# Patient Record
Sex: MALE | Race: WHITE | NOT HISPANIC OR LATINO | Employment: OTHER | URBAN - METROPOLITAN AREA
[De-identification: names, ages, dates, MRNs, and addresses within clinical notes are randomized per-mention and may not be internally consistent; named-entity substitution may affect disease eponyms.]

---

## 2023-11-23 DIAGNOSIS — K21.9 GASTROESOPHAGEAL REFLUX DISEASE, UNSPECIFIED WHETHER ESOPHAGITIS PRESENT: ICD-10-CM

## 2023-11-24 PROBLEM — R97.20 BPH WITH ELEVATED PSA: Status: ACTIVE | Noted: 2019-06-25

## 2023-11-24 PROBLEM — E11.9 DIABETES MELLITUS (MULTI): Status: ACTIVE | Noted: 2019-06-25

## 2023-11-24 PROBLEM — I25.10 ATHEROSCLEROTIC CARDIOVASCULAR DISEASE: Status: ACTIVE | Noted: 2023-11-24

## 2023-11-24 PROBLEM — M51.26 DISC DISPLACEMENT, LUMBAR: Status: ACTIVE | Noted: 2023-11-24

## 2023-11-24 PROBLEM — R91.1 LUNG NODULE: Status: ACTIVE | Noted: 2020-04-21

## 2023-11-24 PROBLEM — J06.9 VIRAL UPPER RESPIRATORY INFECTION: Status: ACTIVE | Noted: 2020-03-06

## 2023-11-24 PROBLEM — K21.00 GERD WITH ESOPHAGITIS: Status: ACTIVE | Noted: 2019-03-18

## 2023-11-24 PROBLEM — H91.10 HEARING LOSS OF AGING: Status: ACTIVE | Noted: 2021-06-28

## 2023-11-24 PROBLEM — I82.90 VENOUS THROMBOSIS: Status: ACTIVE | Noted: 2023-11-24

## 2023-11-24 PROBLEM — E83.52 HYPERCALCEMIA: Status: ACTIVE | Noted: 2019-12-04

## 2023-11-24 PROBLEM — I20.9 ANGINA PECTORIS (CMS-HCC): Status: ACTIVE | Noted: 2023-11-24

## 2023-11-24 PROBLEM — I25.10 CORONARY ARTERY DISEASE: Status: ACTIVE | Noted: 2023-01-05

## 2023-11-24 PROBLEM — E03.9 HYPOTHYROID: Status: ACTIVE | Noted: 2019-03-18

## 2023-11-24 PROBLEM — Z85.850 HX OF THYROID CANCER: Status: ACTIVE | Noted: 2019-06-25

## 2023-11-24 PROBLEM — M62.552 ATROPHY OF MUSCLE OF LEFT THIGH: Status: ACTIVE | Noted: 2019-01-23

## 2023-11-24 PROBLEM — B35.6 TINEA CRURIS: Status: ACTIVE | Noted: 2019-06-06

## 2023-11-24 PROBLEM — E87.5 HYPERKALEMIA: Status: ACTIVE | Noted: 2020-06-15

## 2023-11-24 PROBLEM — N40.0 BPH WITH ELEVATED PSA: Status: ACTIVE | Noted: 2019-06-25

## 2023-11-24 PROBLEM — G57.22: Status: ACTIVE | Noted: 2021-01-25

## 2023-11-24 RX ORDER — OMEPRAZOLE 40 MG/1
40 CAPSULE, DELAYED RELEASE ORAL DAILY
Qty: 90 CAPSULE | Refills: 3 | Status: SHIPPED | OUTPATIENT
Start: 2023-11-24 | End: 2024-04-15 | Stop reason: SDUPTHER

## 2023-12-11 DIAGNOSIS — M51.26 DISC DISPLACEMENT, LUMBAR: Primary | ICD-10-CM

## 2023-12-12 RX ORDER — PREGABALIN 50 MG/1
CAPSULE ORAL
Qty: 90 CAPSULE | Refills: 1 | Status: SHIPPED | OUTPATIENT
Start: 2023-12-12 | End: 2024-03-31

## 2023-12-14 ENCOUNTER — TELEPHONE (OUTPATIENT)
Dept: PRIMARY CARE | Facility: CLINIC | Age: 73
End: 2023-12-14
Payer: COMMERCIAL

## 2023-12-14 DIAGNOSIS — E78.5 HYPERLIPIDEMIA, UNSPECIFIED HYPERLIPIDEMIA TYPE: ICD-10-CM

## 2023-12-14 RX ORDER — ATORVASTATIN CALCIUM 80 MG/1
80 TABLET, FILM COATED ORAL DAILY
Qty: 90 TABLET | Refills: 3 | Status: SHIPPED | OUTPATIENT
Start: 2023-12-14 | End: 2024-12-13

## 2023-12-14 RX ORDER — ATORVASTATIN CALCIUM 80 MG/1
80 TABLET, FILM COATED ORAL DAILY
COMMUNITY
End: 2023-12-14 | Stop reason: SDUPTHER

## 2023-12-14 NOTE — TELEPHONE ENCOUNTER
[facility]    ATORVASTATIN 80 MG  *NEEDS ENOUGH TO GET HIM TILL HIS APPT 1/9/24

## 2024-01-09 ENCOUNTER — APPOINTMENT (OUTPATIENT)
Dept: PRIMARY CARE | Facility: CLINIC | Age: 74
End: 2024-01-09
Payer: COMMERCIAL

## 2024-01-23 ENCOUNTER — OFFICE VISIT (OUTPATIENT)
Dept: PRIMARY CARE | Facility: CLINIC | Age: 74
End: 2024-01-23
Payer: COMMERCIAL

## 2024-01-23 VITALS
WEIGHT: 201 LBS | OXYGEN SATURATION: 96 % | DIASTOLIC BLOOD PRESSURE: 80 MMHG | HEIGHT: 69 IN | SYSTOLIC BLOOD PRESSURE: 128 MMHG | HEART RATE: 71 BPM | BODY MASS INDEX: 29.77 KG/M2

## 2024-01-23 DIAGNOSIS — I25.10 CORONARY ARTERY DISEASE INVOLVING NATIVE CORONARY ARTERY OF NATIVE HEART, UNSPECIFIED WHETHER ANGINA PRESENT: ICD-10-CM

## 2024-01-23 DIAGNOSIS — Z12.11 SCREENING FOR COLON CANCER: ICD-10-CM

## 2024-01-23 DIAGNOSIS — E83.52 HYPERCALCEMIA: ICD-10-CM

## 2024-01-23 DIAGNOSIS — Z00.00 WELL ADULT EXAM: Primary | ICD-10-CM

## 2024-01-23 DIAGNOSIS — E87.5 HYPERKALEMIA: ICD-10-CM

## 2024-01-23 DIAGNOSIS — E03.9 HYPOTHYROIDISM, UNSPECIFIED TYPE: ICD-10-CM

## 2024-01-23 DIAGNOSIS — E13.9 DIABETES MELLITUS OF OTHER TYPE WITHOUT COMPLICATION, UNSPECIFIED WHETHER LONG TERM INSULIN USE (MULTI): ICD-10-CM

## 2024-01-23 DIAGNOSIS — K21.00 GASTROESOPHAGEAL REFLUX DISEASE WITH ESOPHAGITIS WITHOUT HEMORRHAGE: ICD-10-CM

## 2024-01-23 PROCEDURE — 1125F AMNT PAIN NOTED PAIN PRSNT: CPT | Performed by: FAMILY MEDICINE

## 2024-01-23 PROCEDURE — 3079F DIAST BP 80-89 MM HG: CPT | Performed by: FAMILY MEDICINE

## 2024-01-23 PROCEDURE — 3074F SYST BP LT 130 MM HG: CPT | Performed by: FAMILY MEDICINE

## 2024-01-23 PROCEDURE — G0439 PPPS, SUBSEQ VISIT: HCPCS | Performed by: FAMILY MEDICINE

## 2024-01-23 PROCEDURE — 1159F MED LIST DOCD IN RCRD: CPT | Performed by: FAMILY MEDICINE

## 2024-01-23 PROCEDURE — 1036F TOBACCO NON-USER: CPT | Performed by: FAMILY MEDICINE

## 2024-01-23 RX ORDER — FLUTICASONE PROPIONATE 50 MCG
1 SPRAY, SUSPENSION (ML) NASAL
COMMUNITY
Start: 2019-11-13

## 2024-01-23 RX ORDER — METOPROLOL SUCCINATE 100 MG/1
1 TABLET, EXTENDED RELEASE ORAL DAILY
COMMUNITY
Start: 2023-12-01

## 2024-01-23 RX ORDER — NAPROXEN SODIUM 220 MG/1
TABLET, FILM COATED ORAL EVERY 24 HOURS
COMMUNITY

## 2024-01-23 RX ORDER — AMLODIPINE BESYLATE 5 MG/1
1 TABLET ORAL DAILY
COMMUNITY
Start: 2023-05-30

## 2024-01-23 RX ORDER — ACETAMINOPHEN 500 MG
TABLET ORAL
COMMUNITY

## 2024-01-23 RX ORDER — LEVOTHYROXINE SODIUM 150 UG/1
TABLET ORAL
COMMUNITY
Start: 2022-05-31 | End: 2024-04-12

## 2024-01-23 RX ORDER — EZETIMIBE 10 MG/1
10 TABLET ORAL DAILY
COMMUNITY

## 2024-01-23 RX ORDER — MULTIVITAMIN
1 TABLET ORAL
COMMUNITY

## 2024-01-23 RX ORDER — ALBUTEROL SULFATE 90 UG/1
2 AEROSOL, METERED RESPIRATORY (INHALATION) EVERY 4 HOURS PRN
COMMUNITY
Start: 2023-06-13

## 2024-01-23 RX ORDER — ICOSAPENT ETHYL 1000 MG/1
2 CAPSULE ORAL
COMMUNITY

## 2024-01-23 RX ORDER — ACETAMINOPHEN 325 MG/1
TABLET ORAL EVERY 6 HOURS PRN
COMMUNITY
Start: 2022-10-26

## 2024-01-23 ASSESSMENT — PATIENT HEALTH QUESTIONNAIRE - PHQ9
SUM OF ALL RESPONSES TO PHQ9 QUESTIONS 1 AND 2: 0
2. FEELING DOWN, DEPRESSED OR HOPELESS: NOT AT ALL
1. LITTLE INTEREST OR PLEASURE IN DOING THINGS: NOT AT ALL

## 2024-01-23 ASSESSMENT — PAIN SCALES - GENERAL: PAINLEVEL: 4

## 2024-01-23 NOTE — PROGRESS NOTES
Subjective   Patient ID: Justin Brandt is a 73 y.o. male who presents for Annual Exam.    HPI The patient presents for a comprehensive physical exam. PMH, PSH, family history and social history were reviewed and updated. Tetanus and shingles vaccination statuses are UTD. He will discuss AAA screening with his cardiologist. He had fasting labs completed recently. He had HIV and hepatitis C screening completed in 2014. The results were negative.   Patient has coronary disease.  He is status post CABG in 2022.  He [facility]ology [facility].  Pt has Hx Hyperkalemia: noted in the past and in the recent labs, asymptomatic, will recheck potassium level, admits to eating a lot of artichokes.  Pt has DM type 2: controlled, compliant with medication, follows a DM diet, walks dog 3-4 days a week, no other exercise routine due to chronic back pain, does not check glucose at home often, DM eye exam completed in March without DM changes, has never had a DM foot exam, urine microalbumin test is overdue.   Pt has HTN: controlled, compliant with medication, under the care of Dr. Khan (seen every 6 months), checks BP at home and average is 130s/70s, does not follow a low salt diet, exercises 3-4 days a week.   Pt has Hyperlipidemia: controlled, , compliant with medication, follows a low fat diet, exercises, needs refill.   Pt has Hypothyroidism: controlled, TSH normal, compliant with medication, needs refill.  Pt has Lipomas of the chest: has had them for a few years, one below left breast is growing and causing discomfort, one in the left axillary line is growing and causing pain, patient requests a surgical consultation.   Pt has GERD with esophagitis: EGD completed 5 years ago and will be repeated next month, needs refill for omeprazole.   Pt has For the past 10 days he has had loose bowels . He had recently spent time in [place] and was eating different foods . He has known history of diverticulosis and  feels he may be having a mild flare . He has noticed increase in stool frequency with mild bloating of his abdomen . No recent diet change since he is return from [place]. He has noticed no blood.      Review of Systems  GENERAL: denies lack of energy, unexplained weight gain or weight loss, loss of appetite, fever, night sweats.  HEENT: denies sinus problems, runny nose, post-nasal drip, ringing in ears, mouth sores, loose teeth, ear pain, nosebleeds, sore throat, facial pain or numbness, admits to difficulty with hearing.  CV: denies irregular heartbeat, racing heart, chest pains, swelling of feet or legs, pain in legs with walking.  RESPIRATORY: denies shortness of breath, prolonged cough, wheezing, sputum production, prior tuberculosis, pleurisy, oxygen at home, coughing up blood, abnormal chest x-ray.  GI: Patient is positive for change in stool quantity and quality. denies heartburn, constipation, intolerance to certain foods, diarrhea, abdominal pain, difficulty swallowing, nausea, vomiting, blood in stools, unexplained change in bowel habits, incontinence.  : denies painful urination, frequent urination, urgency, bladder problems, prostate problems, impotence.  MS: denies joint pain, aching muscles, swelling of joints, joint deformities, admits to back pain.  INTEGUMENT: denies persistent rash, itching, new skin lesion, hair loss or increase, admits to change in existing skin lesions.  NEUROLOGIC: denies frequent headaches, double vision, weakness, change in sensation, problems with walking or balance, dizziness, tremor, loss of consciousness, uncontrolled motions, episodes of visual loss.  PSYCHIATRIC: denies insomnia, irritability, depression, anxiety, recurrent bad thoughts.  ENDOCRINOLOGIC: denies intolerance to heat or cold, frequent hunger/urination/thirst.  HEMATOLOGIC: denies easy bleeding, easy bruising, anemia, leukemia, unexplained swollen areas.  ALLERGIC/IMMUNOLOGIC: denies seasonal  "allergies, hay fever symptoms, itching, frequent infections, exposure to HIV.  Objective   Ht 1.74 m (5' 8.5\")   Wt 91.2 kg (201 lb)   BMI 30.11 kg/m²     Physical Exam  General appearance: Well developed, overweight, in no acute distress.  Skin: Inspection of the skin reveals no rashes, ulceration, or petechiae. There are multiple lipomas on the trunk in the chest area. Two are tender with palpation: below the left breast and right axillary line.   HEENT: The sclerae were anicteric and conjunctivae were pink and moist. Extraocular movements were intact and pupils were equal, round, and reactive to light with normal accommodation. External inspection of the ears and nose showed no scars, lesions, or masses. EACs clear, TMs translucent, ossicles normal appearance, hearing intact. Nasal mucosa non-inflamed, turbinates normal. Lips, teeth, and gums showed normal mucosa. The oral mucosa, hard and soft palate, tongue and posterior pharynx were normal.  Neck: Supple and symmetric. There was no thyroid enlargement, and no tenderness, or masses were felt.   Lungs: Auscultation of the lungs revealed normal breath sounds without any other adventitious sounds.  Cardiovascular: There was a regular rate and rhythm without any murmurs, gallops, or rubs. There were no carotid bruits. Peripheral pulses were 2+ and symmetric. Brisk capillary refill.  Abdomen: Soft and nontender with normal bowel sounds. The liver was not enlarged or tender. The spleen was not palpable. No ascites was noted.  Lymph nodes: No lymphadenopathy was appreciated in the neck.  Musculoskeletal: There was no tenderness or effusions noted. Muscle strength and tone were normal.   Extremities: No cyanosis, clubbing, or edema.  Neurologic: Alert and oriented x 3. Normal affect. Normal deep tendon reflexes with no pathological reflexes. Sensation to touch was normal.   Rectal: Deferred.   Breasts: Deferred.  Genital: Deferred.  DM foot exam: Skin temperature " normal. Skin intact. Pulses normal. Monofilament test normal.   Assessment/Plan   Problem List Items Addressed This Visit             ICD-10-CM    Coronary artery disease  Chronic.  Will get a lipid panel in the near future.Patient status post CABG.  Patient sees [facility]ology at the [facility]. I25.10    Relevant Medications    metoprolol succinate XL (Toprol-XL) 100 mg 24 hr tablet    amLODIPine (Norvasc) 5 mg tablet    Diabetes mellitus (CMS/HCC)  Needs workup.  Patient have lab work done in the near future including a hemoglobin A1c.   E11.9    Relevant Orders    CBC and Auto Differential    Comprehensive Metabolic Panel    Lipid Panel    Urinalysis with Reflex Microscopic    TSH with reflex to Free T4 if abnormal    Hemoglobin A1C    GERD with esophagitis  Stable.  Continue on omeprazole. K21.00    Hypercalcemia  Presumed stable.  Will get lab work done in the near future. E83.52    Relevant Orders    CBC and Auto Differential    Comprehensive Metabolic Panel    Lipid Panel    Urinalysis with Reflex Microscopic    TSH with reflex to Free T4 if abnormal    Hemoglobin A1C    Hyperkalemia  Presumed stable.  Will get lab work done in the near future. E87.5    Relevant Orders    CBC and Auto Differential    Comprehensive Metabolic Panel    Lipid Panel    Urinalysis with Reflex Microscopic    TSH with reflex to Free T4 if abnormal    Hemoglobin A1C    Hypothyroid  Needs workup.  Will get a TSH in the near future.  At this time continue on Synthroid d.a.w. 150 mcg daily. E03.9    Relevant Orders    CBC and Auto Differential    Comprehensive Metabolic Panel    Lipid Panel    Urinalysis with Reflex Microscopic    TSH with reflex to Free T4 if abnormal    Hemoglobin A1C     Other Visit Diagnoses         Codes    Well adult exam    -  Primary  Normal exam Z00.00    Screening for colon cancer    Needs referral.  Patient is due for screening colonoscopy. Z12.11    Relevant Orders    Referral to Gastroenterology

## 2024-01-31 ENCOUNTER — LAB (OUTPATIENT)
Dept: LAB | Facility: LAB | Age: 74
End: 2024-01-31
Payer: COMMERCIAL

## 2024-01-31 DIAGNOSIS — E87.5 HYPERKALEMIA: ICD-10-CM

## 2024-01-31 DIAGNOSIS — E13.9 DIABETES MELLITUS OF OTHER TYPE WITHOUT COMPLICATION, UNSPECIFIED WHETHER LONG TERM INSULIN USE (MULTI): ICD-10-CM

## 2024-01-31 DIAGNOSIS — E83.52 HYPERCALCEMIA: ICD-10-CM

## 2024-01-31 DIAGNOSIS — E03.9 HYPOTHYROIDISM, UNSPECIFIED TYPE: ICD-10-CM

## 2024-01-31 LAB
ALBUMIN SERPL-MCNC: 4.6 G/DL (ref 3.5–5)
ALP BLD-CCNC: 144 U/L (ref 35–125)
ALT SERPL-CCNC: 30 U/L (ref 5–40)
ANION GAP SERPL CALC-SCNC: 10 MMOL/L
APPEARANCE UR: CLEAR
AST SERPL-CCNC: 19 U/L (ref 5–40)
BASOPHILS # BLD AUTO: 0.05 X10*3/UL (ref 0–0.1)
BASOPHILS NFR BLD AUTO: 0.9 %
BILIRUB SERPL-MCNC: 0.5 MG/DL (ref 0.1–1.2)
BILIRUB UR STRIP.AUTO-MCNC: NEGATIVE MG/DL
BUN SERPL-MCNC: 18 MG/DL (ref 8–25)
CALCIUM SERPL-MCNC: 10 MG/DL (ref 8.5–10.4)
CHLORIDE SERPL-SCNC: 101 MMOL/L (ref 97–107)
CHOLEST SERPL-MCNC: 125 MG/DL (ref 133–200)
CHOLEST/HDLC SERPL: 3.5 {RATIO}
CO2 SERPL-SCNC: 28 MMOL/L (ref 24–31)
COLOR UR: NORMAL
CREAT SERPL-MCNC: 1.1 MG/DL (ref 0.4–1.6)
EGFRCR SERPLBLD CKD-EPI 2021: 70 ML/MIN/1.73M*2
EOSINOPHIL # BLD AUTO: 0.12 X10*3/UL (ref 0–0.4)
EOSINOPHIL NFR BLD AUTO: 2.2 %
ERYTHROCYTE [DISTWIDTH] IN BLOOD BY AUTOMATED COUNT: 14.1 % (ref 11.5–14.5)
EST. AVERAGE GLUCOSE BLD GHB EST-MCNC: 174 MG/DL
GLUCOSE SERPL-MCNC: 189 MG/DL (ref 65–99)
GLUCOSE UR STRIP.AUTO-MCNC: NORMAL MG/DL
HBA1C MFR BLD: 7.7 %
HCT VFR BLD AUTO: 41 % (ref 41–52)
HDLC SERPL-MCNC: 36 MG/DL
HGB BLD-MCNC: 13.3 G/DL (ref 13.5–17.5)
IMM GRANULOCYTES # BLD AUTO: 0.02 X10*3/UL (ref 0–0.5)
IMM GRANULOCYTES NFR BLD AUTO: 0.4 % (ref 0–0.9)
KETONES UR STRIP.AUTO-MCNC: NEGATIVE MG/DL
LDLC SERPL CALC-MCNC: 48 MG/DL (ref 65–130)
LEUKOCYTE ESTERASE UR QL STRIP.AUTO: NEGATIVE
LYMPHOCYTES # BLD AUTO: 1.63 X10*3/UL (ref 0.8–3)
LYMPHOCYTES NFR BLD AUTO: 29.9 %
MCH RBC QN AUTO: 28.1 PG (ref 26–34)
MCHC RBC AUTO-ENTMCNC: 32.4 G/DL (ref 32–36)
MCV RBC AUTO: 87 FL (ref 80–100)
MONOCYTES # BLD AUTO: 0.49 X10*3/UL (ref 0.05–0.8)
MONOCYTES NFR BLD AUTO: 9 %
NEUTROPHILS # BLD AUTO: 3.14 X10*3/UL (ref 1.6–5.5)
NEUTROPHILS NFR BLD AUTO: 57.6 %
NITRITE UR QL STRIP.AUTO: NEGATIVE
NRBC BLD-RTO: 0 /100 WBCS (ref 0–0)
PH UR STRIP.AUTO: 5.5 [PH]
PLATELET # BLD AUTO: 178 X10*3/UL (ref 150–450)
POTASSIUM SERPL-SCNC: 5 MMOL/L (ref 3.4–5.1)
PROT SERPL-MCNC: 6.4 G/DL (ref 5.9–7.9)
PROT UR STRIP.AUTO-MCNC: NEGATIVE MG/DL
RBC # BLD AUTO: 4.73 X10*6/UL (ref 4.5–5.9)
RBC # UR STRIP.AUTO: NEGATIVE /UL
SODIUM SERPL-SCNC: 139 MMOL/L (ref 133–145)
SP GR UR STRIP.AUTO: 1.02
TRIGL SERPL-MCNC: 207 MG/DL (ref 40–150)
TSH SERPL DL<=0.05 MIU/L-ACNC: 0.74 MIU/L (ref 0.27–4.2)
UROBILINOGEN UR STRIP.AUTO-MCNC: NORMAL MG/DL
WBC # BLD AUTO: 5.5 X10*3/UL (ref 4.4–11.3)

## 2024-01-31 PROCEDURE — 85025 COMPLETE CBC W/AUTO DIFF WBC: CPT

## 2024-01-31 PROCEDURE — 36415 COLL VENOUS BLD VENIPUNCTURE: CPT

## 2024-01-31 PROCEDURE — 80061 LIPID PANEL: CPT

## 2024-01-31 PROCEDURE — 80053 COMPREHEN METABOLIC PANEL: CPT

## 2024-01-31 PROCEDURE — 84443 ASSAY THYROID STIM HORMONE: CPT

## 2024-01-31 PROCEDURE — 83036 HEMOGLOBIN GLYCOSYLATED A1C: CPT

## 2024-01-31 PROCEDURE — 81003 URINALYSIS AUTO W/O SCOPE: CPT

## 2024-02-01 ENCOUNTER — TELEPHONE (OUTPATIENT)
Dept: PRIMARY CARE | Facility: CLINIC | Age: 74
End: 2024-02-01
Payer: COMMERCIAL

## 2024-02-07 ENCOUNTER — OFFICE VISIT (OUTPATIENT)
Dept: PRIMARY CARE | Facility: CLINIC | Age: 74
End: 2024-02-07
Payer: COMMERCIAL

## 2024-02-07 VITALS
WEIGHT: 198 LBS | OXYGEN SATURATION: 98 % | BODY MASS INDEX: 29.66 KG/M2 | RESPIRATION RATE: 18 BRPM | SYSTOLIC BLOOD PRESSURE: 124 MMHG | HEART RATE: 60 BPM | DIASTOLIC BLOOD PRESSURE: 64 MMHG

## 2024-02-07 DIAGNOSIS — E13.9 DIABETES MELLITUS OF OTHER TYPE WITHOUT COMPLICATION, UNSPECIFIED WHETHER LONG TERM INSULIN USE (MULTI): Primary | ICD-10-CM

## 2024-02-07 PROCEDURE — 1170F FXNL STATUS ASSESSED: CPT | Performed by: FAMILY MEDICINE

## 2024-02-07 PROCEDURE — 3074F SYST BP LT 130 MM HG: CPT | Performed by: FAMILY MEDICINE

## 2024-02-07 PROCEDURE — 1159F MED LIST DOCD IN RCRD: CPT | Performed by: FAMILY MEDICINE

## 2024-02-07 PROCEDURE — 3078F DIAST BP <80 MM HG: CPT | Performed by: FAMILY MEDICINE

## 2024-02-07 PROCEDURE — 1036F TOBACCO NON-USER: CPT | Performed by: FAMILY MEDICINE

## 2024-02-07 PROCEDURE — 1125F AMNT PAIN NOTED PAIN PRSNT: CPT | Performed by: FAMILY MEDICINE

## 2024-02-07 PROCEDURE — 99214 OFFICE O/P EST MOD 30 MIN: CPT | Performed by: FAMILY MEDICINE

## 2024-02-07 PROCEDURE — 3051F HG A1C>EQUAL 7.0%<8.0%: CPT | Performed by: FAMILY MEDICINE

## 2024-02-07 PROCEDURE — 3048F LDL-C <100 MG/DL: CPT | Performed by: FAMILY MEDICINE

## 2024-02-07 RX ORDER — GLIMEPIRIDE 1 MG/1
1 TABLET ORAL
Qty: 90 TABLET | Refills: 3 | Status: SHIPPED | OUTPATIENT
Start: 2024-02-07 | End: 2025-02-06

## 2024-02-07 ASSESSMENT — ACTIVITIES OF DAILY LIVING (ADL)
BATHING: INDEPENDENT
DRESSING YOURSELF: INDEPENDENT
GROOMING: INDEPENDENT
HEARING - RIGHT EAR: HEARING AID
WALKS IN HOME: INDEPENDENT
HEARING - LEFT EAR: HEARING AID
ADEQUATE_TO_COMPLETE_ADL: YES
FEEDING YOURSELF: INDEPENDENT
JUDGMENT_ADEQUATE_SAFELY_COMPLETE_DAILY_ACTIVITIES: YES
PATIENT'S MEMORY ADEQUATE TO SAFELY COMPLETE DAILY ACTIVITIES?: YES
TOILETING: INDEPENDENT

## 2024-02-07 ASSESSMENT — ENCOUNTER SYMPTOMS
LOSS OF SENSATION IN FEET: 0
OCCASIONAL FEELINGS OF UNSTEADINESS: 0
DEPRESSION: 0

## 2024-02-07 ASSESSMENT — PAIN SCALES - GENERAL: PAINLEVEL: 7

## 2024-02-07 NOTE — PROGRESS NOTES
Subjective   Patient ID: Justin Brandt is a 73 y.o. male who presents for Results (Blood work results).    [facility] For diabetic check.  Patient had recent labs done at his physical and his hemoglobin A1c was elevated.  His hemoglobin A1c is 7.7.  In the past he was on metformin but did not tolerate the medication which caused him diarrhea.  He has not been on any other medications.    Review of Systems  Constitutional: Patient is negative for fever, fatigue, weight change.  HEENT: Patient is negative for change in vision, hearing, swallow.  Cardio: Patient is negative for chest pain, lower extremity edema.  Pulmonary: Patient is negative for cough, shortness of breath.  Objective   /64 (BP Location: Left arm, Patient Position: Sitting, BP Cuff Size: Large adult)   Pulse 60   Resp 18   Wt 89.8 kg (198 lb)   SpO2 98%   BMI 29.66 kg/m²     Physical Exam  General: Awake and alert in no apparent distress.  HEENT: Moist oral mucosa no cervical lymphadenopathy.  Cardio: Heart S1-S2 no murmur rub or gallop.  Pulmonary: Lungs clear to auscultation bilaterally.  Assessment/Plan   Problem List Items Addressed This Visit             ICD-10-CM    Diabetes mellitus (CMS/[facility]) - Primary  Needs better control.  Begin glimepiride 1 mg daily.  Patient will follow-up in 3 months for recheck. E11.9    Relevant Medications    glimepiride (AmaryL) 1 mg tablet    Other Relevant Orders    Follow Up In Primary Care - Established

## 2024-03-11 ENCOUNTER — PATIENT OUTREACH (OUTPATIENT)
Dept: PRIMARY CARE | Facility: CLINIC | Age: 74
End: 2024-03-11
Payer: COMMERCIAL

## 2024-03-11 NOTE — PROGRESS NOTES
Patient has declined PCP follow up and TCM services at this time. He states that he will see pulmonology in April and then will follow up with PCP. He was very thankful for this call.

## 2024-04-12 DIAGNOSIS — E03.9 HYPOTHYROIDISM, UNSPECIFIED TYPE: ICD-10-CM

## 2024-04-12 RX ORDER — LEVOTHYROXINE SODIUM 150 UG/1
TABLET ORAL
Qty: 90 TABLET | Refills: 2 | Status: SHIPPED | OUTPATIENT
Start: 2024-04-12

## 2024-04-15 ENCOUNTER — TELEPHONE (OUTPATIENT)
Dept: PRIMARY CARE | Facility: CLINIC | Age: 74
End: 2024-04-15
Payer: COMMERCIAL

## 2024-04-15 DIAGNOSIS — K21.9 GASTROESOPHAGEAL REFLUX DISEASE, UNSPECIFIED WHETHER ESOPHAGITIS PRESENT: ICD-10-CM

## 2024-04-15 RX ORDER — OMEPRAZOLE 20 MG/1
20 CAPSULE, DELAYED RELEASE ORAL DAILY
Qty: 90 CAPSULE | Refills: 0 | Status: SHIPPED | OUTPATIENT
Start: 2024-04-15

## 2024-04-15 NOTE — TELEPHONE ENCOUNTER
Patient is calling in for a refill on Omeprazole ER 20 mg #90, please send to [facility] on [address].  Seen last on 2/7/24.

## 2024-04-15 NOTE — TELEPHONE ENCOUNTER
Patient called Rx line and requested a refill for Omeprazole 20 mg. The dose was previously 40 mg, and the pharmacy needs this updated in order to refill it.  Pharmacy: [facility] [address]  Patient phone #: [phone]

## 2024-05-19 DIAGNOSIS — M51.26 DISC DISPLACEMENT, LUMBAR: ICD-10-CM

## 2024-05-20 RX ORDER — PREGABALIN 50 MG/1
CAPSULE ORAL
Qty: 90 CAPSULE | Refills: 0 | Status: SHIPPED | OUTPATIENT
Start: 2024-05-20

## 2024-06-30 DIAGNOSIS — M51.26 DISC DISPLACEMENT, LUMBAR: ICD-10-CM

## 2024-07-01 RX ORDER — PREGABALIN 50 MG/1
CAPSULE ORAL
Qty: 90 CAPSULE | Refills: 0 | Status: SHIPPED | OUTPATIENT
Start: 2024-07-01

## 2024-08-11 DIAGNOSIS — K21.9 GASTROESOPHAGEAL REFLUX DISEASE, UNSPECIFIED WHETHER ESOPHAGITIS PRESENT: ICD-10-CM

## 2024-08-11 DIAGNOSIS — M51.26 DISC DISPLACEMENT, LUMBAR: ICD-10-CM

## 2024-08-12 ENCOUNTER — APPOINTMENT (OUTPATIENT)
Dept: PRIMARY CARE | Facility: CLINIC | Age: 74
End: 2024-08-12
Payer: COMMERCIAL

## 2024-08-12 RX ORDER — PREGABALIN 50 MG/1
CAPSULE ORAL
Qty: 90 CAPSULE | Refills: 0 | Status: SHIPPED | OUTPATIENT
Start: 2024-08-12

## 2024-08-12 RX ORDER — OMEPRAZOLE 20 MG/1
20 CAPSULE, DELAYED RELEASE ORAL DAILY
Qty: 90 CAPSULE | Refills: 3 | Status: SHIPPED | OUTPATIENT
Start: 2024-08-12

## 2024-09-15 DIAGNOSIS — M51.26 DISC DISPLACEMENT, LUMBAR: ICD-10-CM

## 2024-09-16 RX ORDER — PREGABALIN 50 MG/1
CAPSULE ORAL
Qty: 90 CAPSULE | Refills: 1 | Status: SHIPPED | OUTPATIENT
Start: 2024-09-16

## 2024-09-26 ENCOUNTER — APPOINTMENT (OUTPATIENT)
Dept: PRIMARY CARE | Facility: CLINIC | Age: 74
End: 2024-09-26
Payer: COMMERCIAL

## 2024-09-26 VITALS
WEIGHT: 198 LBS | HEART RATE: 53 BPM | HEIGHT: 69 IN | BODY MASS INDEX: 29.33 KG/M2 | DIASTOLIC BLOOD PRESSURE: 78 MMHG | OXYGEN SATURATION: 95 % | SYSTOLIC BLOOD PRESSURE: 164 MMHG

## 2024-09-26 DIAGNOSIS — M51.26 DISC DISPLACEMENT, LUMBAR: Primary | ICD-10-CM

## 2024-09-26 DIAGNOSIS — R16.1 SPLENOMEGALY: ICD-10-CM

## 2024-09-26 PROCEDURE — 1036F TOBACCO NON-USER: CPT | Performed by: FAMILY MEDICINE

## 2024-09-26 PROCEDURE — 1159F MED LIST DOCD IN RCRD: CPT | Performed by: FAMILY MEDICINE

## 2024-09-26 PROCEDURE — 1126F AMNT PAIN NOTED NONE PRSNT: CPT | Performed by: FAMILY MEDICINE

## 2024-09-26 PROCEDURE — 99214 OFFICE O/P EST MOD 30 MIN: CPT | Performed by: FAMILY MEDICINE

## 2024-09-26 ASSESSMENT — PATIENT HEALTH QUESTIONNAIRE - PHQ9
SUM OF ALL RESPONSES TO PHQ9 QUESTIONS 1 AND 2: 0
1. LITTLE INTEREST OR PLEASURE IN DOING THINGS: NOT AT ALL
2. FEELING DOWN, DEPRESSED OR HOPELESS: NOT AT ALL

## 2024-09-26 ASSESSMENT — PAIN SCALES - GENERAL: PAINLEVEL: 0-NO PAIN

## 2024-09-26 NOTE — PROGRESS NOTES
"Subjective   Patient ID: Justin Brandt is a 74 y.o. male who presents for Follow-up (Test results - was referred back here from his hematologist regarding enlarged spleen//Flu vaccine declined today).    HPI here for multiple issues.    Patient patient has specialist at the [facility].  He has antiphospholipid syndrome.  This syndrome can mimic autoimmune disease.  It also makes patient a set up for coagulation.  Of concern, is an enlarged spleen that was found on CT scan.  However investigation finds that patient had a an ultrasound of the spleen done in April, 2024 that was normal.  The CT scan was done in July 2024 that showed enlargement.  Patient has chronic back pain.  He has had multiple surgeries.  He has been seen by pain management in the past.  He does not desire surgeries at this time.  He would like to return to his pain management physician.      Review of Systems  Constitutional: Patient is negative for fever, fatigue, weight change.  HEENT: Patient is negative for change in vision, hearing, swallow.  Cardio: Patient is negative for chest pain, lower extremity edema.  Pulmonary: Patient is negative for cough, shortness of breath  Gastrointestinal: Patient is positive for left upper quadrant fullness without pain.  Musculoskeletal: Patient is positive for low back pain.  Objective   /78 (BP Location: Left arm, Patient Position: Sitting, BP Cuff Size: Adult)   Pulse 53   Ht 1.74 m (5' 8.5\")   Wt 89.8 kg (198 lb)   SpO2 95%   BMI 29.67 kg/m²     Physical Exam  General: Awake and alert no apparent distress.  HEENT: Moist oral mucosa no cervical lymphadenopathy.  Cardio: Heart S1-S2 no murmur rub or gallop.  Pulmonary: Lungs clear to auscultation bilaterally.  Abdomen: Soft nontender without organomegaly.  Assessment/Plan   Problem List Items Addressed This Visit             ICD-10-CM    Disc displacement, lumbar - Primary   needs referral.  Patient referred back to pain management. M51.26    " Relevant Orders    Referral to Pain Medicine     Other Visit Diagnoses         Codes    Splenomegaly    needs workup.  Since patient has had a normal and then elevated spleen size recently, will get another ultrasound of the spleen to document its current size. R16.1    Relevant Orders    US abdomen limited spleen

## 2024-09-30 ENCOUNTER — HOSPITAL ENCOUNTER (OUTPATIENT)
Dept: RADIOLOGY | Facility: HOSPITAL | Age: 74
Discharge: HOME | End: 2024-09-30
Payer: COMMERCIAL

## 2024-09-30 DIAGNOSIS — R16.1 SPLENOMEGALY: ICD-10-CM

## 2024-09-30 PROCEDURE — 76705 ECHO EXAM OF ABDOMEN: CPT

## 2024-09-30 PROCEDURE — 76705 ECHO EXAM OF ABDOMEN: CPT | Performed by: RADIOLOGY

## 2024-10-31 ENCOUNTER — OFFICE VISIT (OUTPATIENT)
Dept: PAIN MEDICINE | Facility: CLINIC | Age: 74
End: 2024-10-31
Payer: COMMERCIAL

## 2024-10-31 VITALS
RESPIRATION RATE: 16 BRPM | WEIGHT: 198 LBS | HEIGHT: 69 IN | OXYGEN SATURATION: 95 % | DIASTOLIC BLOOD PRESSURE: 78 MMHG | BODY MASS INDEX: 29.33 KG/M2 | SYSTOLIC BLOOD PRESSURE: 140 MMHG | HEART RATE: 57 BPM

## 2024-10-31 DIAGNOSIS — M96.1 POSTLAMINECTOMY SYNDROME, LUMBAR REGION: Primary | ICD-10-CM

## 2024-10-31 DIAGNOSIS — M48.062 NEUROGENIC CLAUDICATION DUE TO LUMBAR SPINAL STENOSIS: ICD-10-CM

## 2024-10-31 DIAGNOSIS — G89.29 OTHER CHRONIC PAIN: ICD-10-CM

## 2024-10-31 DIAGNOSIS — M54.16 LUMBAR RADICULOPATHY: ICD-10-CM

## 2024-10-31 PROCEDURE — 99214 OFFICE O/P EST MOD 30 MIN: CPT | Performed by: ANESTHESIOLOGY

## 2024-10-31 PROCEDURE — 1159F MED LIST DOCD IN RCRD: CPT | Performed by: ANESTHESIOLOGY

## 2024-10-31 PROCEDURE — 1036F TOBACCO NON-USER: CPT | Performed by: ANESTHESIOLOGY

## 2024-10-31 PROCEDURE — 1160F RVW MEDS BY RX/DR IN RCRD: CPT | Performed by: ANESTHESIOLOGY

## 2024-10-31 ASSESSMENT — ENCOUNTER SYMPTOMS
ARTHRALGIAS: 1
CONSTITUTIONAL NEGATIVE: 1
ALLERGIC/IMMUNOLOGIC NEGATIVE: 1
GASTROINTESTINAL NEGATIVE: 1
BACK PAIN: 1
HEMATOLOGIC/LYMPHATIC NEGATIVE: 1
EYES NEGATIVE: 1
PSYCHIATRIC NEGATIVE: 1
ENDOCRINE NEGATIVE: 1
WEAKNESS: 1
RESPIRATORY NEGATIVE: 1
CARDIOVASCULAR NEGATIVE: 1

## 2024-10-31 ASSESSMENT — PATIENT HEALTH QUESTIONNAIRE - PHQ9
1. LITTLE INTEREST OR PLEASURE IN DOING THINGS: NOT AT ALL
SUM OF ALL RESPONSES TO PHQ9 QUESTIONS 1 AND 2: 0
2. FEELING DOWN, DEPRESSED OR HOPELESS: NOT AT ALL

## 2024-10-31 ASSESSMENT — PAIN SCALES - GENERAL
PAINLEVEL_OUTOF10: 10-WORST PAIN EVER
PAINLEVEL_OUTOF10: 10 - WORST POSSIBLE PAIN

## 2024-10-31 ASSESSMENT — PAIN DESCRIPTION - DESCRIPTORS: DESCRIPTORS: ACHING

## 2024-10-31 ASSESSMENT — PAIN - FUNCTIONAL ASSESSMENT: PAIN_FUNCTIONAL_ASSESSMENT: 0-10

## 2024-10-31 ASSESSMENT — LIFESTYLE VARIABLES: TOTAL SCORE: 0

## 2024-11-13 ENCOUNTER — ANCILLARY PROCEDURE (OUTPATIENT)
Dept: RADIOLOGY | Facility: EXTERNAL LOCATION | Age: 74
End: 2024-11-13
Payer: COMMERCIAL

## 2024-11-13 DIAGNOSIS — M96.1 POSTLAMINECTOMY SYNDROME, NOT ELSEWHERE CLASSIFIED: ICD-10-CM

## 2024-11-13 PROCEDURE — 62323 NJX INTERLAMINAR LMBR/SAC: CPT | Performed by: ANESTHESIOLOGY

## 2024-11-13 NOTE — PROGRESS NOTES
Preprocedure diagnosis: Lumbar postlaminectomy syndrome  Postprocedure diagnosis lumbar postlaminectomy syndrome    Procedure performed: L2-3 epidural steroid injection under fluoroscopic guidance    Physician: Donte Moreno MD    Anesthesia: The patient received light sedation/anxiolysis for the procedure today.  The patient had a normal response to verbal stimulation throughout the entire procedure.  Airway, ventilation, and cardiovascular systems were unaffected.        Complications: none    Blood loss:  none    Clinical note: This is a very pleasant 74-year-old male who suffers with low back and leg pain here meeting all medical criteria for above-mentioned procedure.    Procedure:      The patient was identified in the preoperative area.  The procedure was discussed in detail including its risks, benefits and alternatives.  Signed consent was obtained and the patient agreed to proceed.    The patient was brought to the [facility] procedure room and was positioned in the prone position onto the procedure room table.  A pillow was placed below the patient's abdomen.  A safety strap was placed across the legs.  The lumbar region was then exposed, prepped and draped in the usual sterile fashion using 2% Chloroprep scrub.  After that, under fluoroscopic guidance in the AP view the L2/3 intralaminar space was identified.  The intended entry point was marked onto the skin and then 10 ml of 0.5% preservative-free lidocaine was injected using a 25 gauge needle to anesthetize the skin and subcutaneous tissues along the intended needle trajectory.  Next, an 18 Tuohy needle was advanced under intermittent fluoroscopic guidance until bony contact was made with the lamina of L3.  The Tuohy needle was then walked off the lamina in a cephalad manner into the L2/3 intralaminar space.  Using a loss of resistance technique, the epidural space was entered with ease.  Images were taken in the AP and contralateral  oblique views.  The stylette was removed from the needle and the needle was aspirated, which was negative for heme and CSF.   Then after another negative aspirate, 40 mg of triamcinolone mixed with 4 mL of preservative-free 0.5% lidocaine was injected via the Tuohy needle.  The needle was then removed and a bandage was applied.  The patient tolerated the procedure well and was transferred back to the discharge area.  The patient was monitored for 15 minutes and vital signs were stable.  The patient was discharged home in stable condition with a .

## 2024-12-08 DIAGNOSIS — M51.26 DISC DISPLACEMENT, LUMBAR: ICD-10-CM

## 2024-12-08 DIAGNOSIS — E78.5 HYPERLIPIDEMIA, UNSPECIFIED HYPERLIPIDEMIA TYPE: ICD-10-CM

## 2024-12-09 ENCOUNTER — TELEPHONE (OUTPATIENT)
Dept: PRIMARY CARE | Facility: CLINIC | Age: 74
End: 2024-12-09

## 2024-12-09 RX ORDER — ATORVASTATIN CALCIUM 80 MG/1
80 TABLET, FILM COATED ORAL DAILY
Qty: 90 TABLET | Refills: 3 | Status: SHIPPED | OUTPATIENT
Start: 2024-12-09

## 2024-12-09 RX ORDER — PREGABALIN 50 MG/1
CAPSULE ORAL
Qty: 90 CAPSULE | Refills: 1 | Status: SHIPPED | OUTPATIENT
Start: 2024-12-09

## 2024-12-11 NOTE — TELEPHONE ENCOUNTER
Patient would like to speak to [facility] for a few minutes, he stated it could be at the end of [facility]'s shift. He has questions regarding Keto supplement to lose weight.    Patient can be reached at  [phone]

## 2024-12-18 ENCOUNTER — APPOINTMENT (OUTPATIENT)
Dept: PRIMARY CARE | Facility: CLINIC | Age: 74
End: 2024-12-18
Payer: COMMERCIAL

## 2024-12-18 VITALS
HEIGHT: 69 IN | OXYGEN SATURATION: 96 % | HEART RATE: 62 BPM | BODY MASS INDEX: 29.41 KG/M2 | TEMPERATURE: 97.7 F | DIASTOLIC BLOOD PRESSURE: 60 MMHG | SYSTOLIC BLOOD PRESSURE: 148 MMHG | WEIGHT: 198.6 LBS

## 2024-12-18 DIAGNOSIS — E66.09 OBESITY DUE TO EXCESS CALORIES, UNSPECIFIED CLASS, UNSPECIFIED WHETHER SERIOUS COMORBIDITY PRESENT: Primary | ICD-10-CM

## 2024-12-18 PROCEDURE — 1159F MED LIST DOCD IN RCRD: CPT | Performed by: FAMILY MEDICINE

## 2024-12-18 PROCEDURE — 99213 OFFICE O/P EST LOW 20 MIN: CPT | Performed by: FAMILY MEDICINE

## 2024-12-18 PROCEDURE — 1036F TOBACCO NON-USER: CPT | Performed by: FAMILY MEDICINE

## 2024-12-18 PROCEDURE — 1125F AMNT PAIN NOTED PAIN PRSNT: CPT | Performed by: FAMILY MEDICINE

## 2024-12-18 ASSESSMENT — PAIN SCALES - GENERAL: PAINLEVEL_OUTOF10: 4

## 2024-12-18 NOTE — PROGRESS NOTES
"Subjective   Patient ID: Justin Brandt is a 74 y.o. male who presents for Consult (Discuss patient taking vitamin keto supplement along with his other medications.).    HPI patient has been having a difficult time losing weight.  He has an OTC keto supplement that he would like to try.    Review of Systems  Constitution: Patient is positive for gradual weight gain over the past 5 years.  He is negative for change in weight, fever.  HEENT: Patient stated for change in vision, hearing, swallow.  Cardio: Patient is negative for chest pain, lower extremity edema.  Pulmonary: Patient is negative for cough, shortness of breath.  Objective   /60 (BP Location: Left arm, Patient Position: Sitting, BP Cuff Size: Large adult)   Pulse 62   Temp 36.5 °C (97.7 °F) (Temporal)   Ht 1.74 m (5' 8.5\")   SpO2 96%   BMI 29.67 kg/m²     Physical Exam  General: Awake and alert no apparent distress.  HEENT: Moist oral mucosa no cervical adenopathy.  Cardiac: Heart S1-S2 no murmur rub or gallop.  Pulmonary: Lungs clear to auscultation bilaterally.  Assessment/Plan   Problem List Items Addressed This Visit    None  Visit Diagnoses         Codes    Obesity due to excess calories, unspecified class, unspecified whether serious comorbidity present    -  Primary patient will attempt to use the OTC keto supplement.  If he is unsuccessful he will follow-up here at which time we will consider GLP-1 if available E66.09               "

## 2025-01-23 ENCOUNTER — OFFICE VISIT (OUTPATIENT)
Dept: PAIN MEDICINE | Facility: CLINIC | Age: 75
End: 2025-01-23
Payer: COMMERCIAL

## 2025-01-23 VITALS
HEART RATE: 60 BPM | SYSTOLIC BLOOD PRESSURE: 130 MMHG | OXYGEN SATURATION: 99 % | RESPIRATION RATE: 22 BRPM | DIASTOLIC BLOOD PRESSURE: 60 MMHG | WEIGHT: 190 LBS | BODY MASS INDEX: 27.2 KG/M2 | HEIGHT: 70 IN

## 2025-01-23 DIAGNOSIS — M54.42 CHRONIC BILATERAL LOW BACK PAIN WITH BILATERAL SCIATICA: ICD-10-CM

## 2025-01-23 DIAGNOSIS — M54.41 CHRONIC BILATERAL LOW BACK PAIN WITH BILATERAL SCIATICA: ICD-10-CM

## 2025-01-23 DIAGNOSIS — M48.062 SPINAL STENOSIS OF LUMBAR REGION WITH NEUROGENIC CLAUDICATION: Primary | ICD-10-CM

## 2025-01-23 DIAGNOSIS — M54.16 LUMBAR RADICULOPATHY: ICD-10-CM

## 2025-01-23 DIAGNOSIS — G89.29 CHRONIC BILATERAL LOW BACK PAIN WITH BILATERAL SCIATICA: ICD-10-CM

## 2025-01-23 DIAGNOSIS — M51.26 DISC DISPLACEMENT, LUMBAR: ICD-10-CM

## 2025-01-23 DIAGNOSIS — M96.1 POSTLAMINECTOMY SYNDROME OF LUMBAR REGION: ICD-10-CM

## 2025-01-23 PROCEDURE — 1159F MED LIST DOCD IN RCRD: CPT | Performed by: NURSE PRACTITIONER

## 2025-01-23 PROCEDURE — G2211 COMPLEX E/M VISIT ADD ON: HCPCS | Performed by: NURSE PRACTITIONER

## 2025-01-23 PROCEDURE — 1160F RVW MEDS BY RX/DR IN RCRD: CPT | Performed by: NURSE PRACTITIONER

## 2025-01-23 PROCEDURE — 99214 OFFICE O/P EST MOD 30 MIN: CPT | Performed by: NURSE PRACTITIONER

## 2025-01-23 PROCEDURE — 1036F TOBACCO NON-USER: CPT | Performed by: NURSE PRACTITIONER

## 2025-01-23 PROCEDURE — 1125F AMNT PAIN NOTED PAIN PRSNT: CPT | Performed by: NURSE PRACTITIONER

## 2025-01-23 RX ORDER — PREGABALIN 50 MG/1
50 CAPSULE ORAL 3 TIMES DAILY
Qty: 90 CAPSULE | Refills: 1 | Status: SHIPPED | OUTPATIENT
Start: 2025-01-23

## 2025-01-23 ASSESSMENT — LIFESTYLE VARIABLES
HOW OFTEN DO YOU HAVE A DRINK CONTAINING ALCOHOL: MONTHLY OR LESS
SKIP TO QUESTIONS 9-10: 1
AUDIT-C TOTAL SCORE: 1
HOW OFTEN DO YOU HAVE SIX OR MORE DRINKS ON ONE OCCASION: NEVER
HOW MANY STANDARD DRINKS CONTAINING ALCOHOL DO YOU HAVE ON A TYPICAL DAY: 1 OR 2

## 2025-01-23 ASSESSMENT — ENCOUNTER SYMPTOMS
CONFUSION: 0
PALPITATIONS: 0
ABDOMINAL DISTENTION: 0
BACK PAIN: 1
AGITATION: 0
DIARRHEA: 0
ABDOMINAL PAIN: 0
CONSTITUTIONAL NEGATIVE: 1
SHORTNESS OF BREATH: 0
VOMITING: 0
SLEEP DISTURBANCE: 0
NAUSEA: 0
HEMATOLOGIC/LYMPHATIC NEGATIVE: 1
DYSURIA: 0
NUMBNESS: 1
COUGH: 0
WEAKNESS: 1
DIFFICULTY URINATING: 0

## 2025-01-23 ASSESSMENT — PAIN - FUNCTIONAL ASSESSMENT: PAIN_FUNCTIONAL_ASSESSMENT: 0-10

## 2025-01-23 ASSESSMENT — PATIENT HEALTH QUESTIONNAIRE - PHQ9
2. FEELING DOWN, DEPRESSED OR HOPELESS: NOT AT ALL
1. LITTLE INTEREST OR PLEASURE IN DOING THINGS: NOT AT ALL
SUM OF ALL RESPONSES TO PHQ9 QUESTIONS 1 & 2: 0

## 2025-01-23 ASSESSMENT — PAIN DESCRIPTION - DESCRIPTORS: DESCRIPTORS: STABBING

## 2025-01-23 ASSESSMENT — PAIN SCALES - GENERAL
PAINLEVEL_OUTOF10: 8
PAINLEVEL_OUTOF10: 8

## 2025-01-23 NOTE — PROGRESS NOTES
Subjective   Patient ID: Justin Brandt is a 74 y.o. male who presents for Back Pain.  Back Pain  Associated symptoms include numbness and weakness. Pertinent negatives include no abdominal pain, chest pain or dysuria.     Patient is here for a follow up and refill for his lumbar pain. He has his last L2/3 CAREN completed in November. He had about 60% improvement. His pain is starting to return and would like to get set up for another injection. He has had multiple surgeries and would like to avoid any further. He is currently taking the lyrcia 50mg BID. He is wondering about increasing. His pain is across the low back and into his BL thighs. This is worse to the left.     Review of Systems   Constitutional: Negative.    HENT: Negative.     Respiratory:  Negative for cough and shortness of breath.    Cardiovascular:  Negative for chest pain, palpitations and leg swelling.   Gastrointestinal:  Negative for abdominal distention, abdominal pain, diarrhea, nausea and vomiting.   Endocrine: Negative for cold intolerance and heat intolerance.   Genitourinary:  Negative for difficulty urinating, dysuria and urgency.   Musculoskeletal:  Positive for back pain.   Skin: Negative.    Neurological:  Positive for weakness and numbness.   Hematological: Negative.    Psychiatric/Behavioral:  Negative for agitation, confusion, sleep disturbance and suicidal ideas.        Objective   Physical Exam  Constitutional:       Appearance: Normal appearance.   Musculoskeletal:      Lumbar back: Tenderness present. Decreased range of motion.   Skin:     General: Skin is warm and dry.   Neurological:      Mental Status: He is alert and oriented to person, place, and time.      Sensory: Sensory deficit (BL lower extremity) present.   Psychiatric:         Mood and Affect: Mood normal.         Behavior: Behavior normal.         Assessment/Plan   Problem List Items Addressed This Visit             ICD-10-CM       Neuro    Disc displacement, lumbar  M51.26    Relevant Medications    pregabalin (Lyrica) 50 mg capsule     Other Visit Diagnoses         Codes    Spinal stenosis of lumbar region with neurogenic claudication    -  Primary M48.062    Lumbar radiculopathy     M54.16    Postlaminectomy syndrome of lumbar region     M96.1    Chronic bilateral low back pain with bilateral sciatica     M54.42, M54.41, G89.29        I nice discussion with the patient today our plan will be as follows.    Radiology: imaging reviewed. He has severe stenosis at L2/3. He has a fusion below.     Physically:  has a daily HEP    Psychologically:  no concern.     Medication: OARRS reviewed. WE will increase lyrica to 1 in am and 2 at bedtime. We may continue to increase if needed next OV.     Duration:  chronic on going.     Intervention:  WE will plan on repeating the L2/3 CAREN. He has had great relief in the past. He is not a candidate for further surgery. Follow up after injection           ERMELINDA Vergara-CNP 01/23/25 1:26 PM

## 2025-02-06 ENCOUNTER — TELEPHONE (OUTPATIENT)
Dept: PAIN MEDICINE | Facility: CLINIC | Age: 75
End: 2025-02-06
Payer: COMMERCIAL

## 2025-02-06 NOTE — TELEPHONE ENCOUNTER
PATIENT STATES HE HAS LEFT SEVERAL MESSAGES (NO MESSAGES RECORDED) TO SCHEDULE HIS INJECTION AT [facility]. REQUESTING A CALL TO SCHEDULE. INJECTION WAS ORDERED FOR HV BY DUSTY AT 1/23 APPT.

## 2025-02-11 DIAGNOSIS — E13.9 DIABETES MELLITUS OF OTHER TYPE WITHOUT COMPLICATION, UNSPECIFIED WHETHER LONG TERM INSULIN USE (MULTI): ICD-10-CM

## 2025-02-11 RX ORDER — GLIMEPIRIDE 1 MG/1
1 TABLET ORAL
Qty: 90 TABLET | Refills: 3 | Status: SHIPPED | OUTPATIENT
Start: 2025-02-11 | End: 2026-02-11

## 2025-02-12 ENCOUNTER — PATIENT OUTREACH (OUTPATIENT)
Dept: PRIMARY CARE | Facility: CLINIC | Age: 75
End: 2025-02-12
Payer: COMMERCIAL

## 2025-02-12 DIAGNOSIS — E03.9 HYPOTHYROIDISM, UNSPECIFIED TYPE: ICD-10-CM

## 2025-02-12 RX ORDER — LEVOTHYROXINE SODIUM 150 UG/1
TABLET ORAL
Qty: 90 TABLET | Refills: 3 | Status: SHIPPED | OUTPATIENT
Start: 2025-02-12

## 2025-02-12 NOTE — PROGRESS NOTES
Discharge Facility: [facility]     Discharge Diagnosis:    Pneumonia due to infectious organism - Primary   COPD exacerbation (HCC)   Obstructive chronic bronchitis with exacerbation   Elevated d-dimer   Abnormal coagulation profile   Shortness of breath   History of antiphospholipid syndrome   Personal history of diseases of blood and blood-forming organs   Acute on chronic respiratory failure with hypoxia (HCC)   Pneumonia of both lower lobes due to infectious organism   Acute respiratory failure with hypoxia (HCC)   Acute respiratory failure   Cough, unspecified type   Other hyperlipidemia   Hx of CABG   Postsurgical aortocoronary bypass status   Essential hypertension   Unspecified essential hypertension   Hypothyroid   Unspecified hypothyroidism   COPD exacerbation (HCC)   Obstructive chronic bronchitis with exacerbation   Antiphospholipid antibody syndrome (HCC)   Primary hypercoagulable state   Viral URI with cough   Acute upper respiratory infections of unspecified site   Acute respiratory failure with hypoxia (HCC)   Acute respiratory failure     Admission Date:  2/7/2025   Discharge Date:  2/11/2025     PCP Appointment Date:  2/18/2025     Specialist Appointment Date:     Dr. Richardson Pulmonary     Hem/ Onc F/U in place     Neurology   [address] [place] [id number]   Phone: tel:[phone]     [facility]   [address] [place] [address]     Hospital Encounter and Summary Linked: Yes    Transitional Care Management Enrollment with Lynne Carlisle (02/12/2025)     See discharge assessment below for further details     Wrap Up  Wrap Up Additional Comments: Patient has spouse for support in community, states has all meds needed. Patient aware of PCP F/U I [facility] 2/18, states will be in communication with pulmonary has Hematology F/U in place through [facility]. Patient encouraged to rinse moth after inhaler use to prevent thrush. Patient aware to call providers for any questions concerns or  change in condition. (2/12/2025 12:28 PM)    Engagement  Call Start Time: 1228 (2/12/2025 12:28 PM)    Medications  Medications reviewed with patient/caregiver?: Yes (2/12/2025 12:28 PM)  Is the patient having any side effects they believe may be caused by any medication additions or changes?: No (2/12/2025 12:28 PM)  Does the patient have all medications ordered at discharge?: -- (Pt states has all meds) (2/12/2025 12:28 PM)  Care Management Interventions: No intervention needed (2/12/2025 12:28 PM)  Prescription Comments: Ordered meds reviewed,glimepiride (AMARYL) 1 mg tablet   Take 1 tablet by mouth two times a day with meals.   60 tablet       02/11/2025 03/13/2025  Oral Medication Containers (SHARPS CONTAINER) misc   1 Container as needed (For disposal of insulin needles, lancets, and other sharp objects) for up to 1 day.   1 Each   3   02/11/2025 02/12/2025  glucose 4 gram chewable tablet   Take 4 tablets by mouth as needed for low blood sugar.   10 tablet   5   02/11/2025    alcohol swabs   Use with blood glucose test 2 times daily. Insulin Dep? No   200 Each   5   02/11/2025    Lancets   Use with blood glucose test 2 times daily. Insulin Dep? No   200 Each   5   02/11/2025    blood sugar diagnostic test strip   Use with blood glucose test 2 times daily, Insulin Dep? No   200 Strip   5   02/11/2025    Blood Glucose Control, Normal soln   Use to calibrate glucometer.   1 Each       02/11/2025 02/12/2025  Blood-Glucose Meter   Test Two times a day.   1 Each       02/11/2025 02/12/2025  fluticasone-umeclidin-vilanter (TRELEGY ELLIPTA) 100-62.5-25 mcg inhalation powder   Inhale 1 Puff as instructed once daily.   60 Each       02/11/2025 03/13/2025  predniSONE (DELTASONE) 20 mg tablet   Indications: Cough, unspecified type Take 2 tablets by mouth once daily for 5 days, THEN 1.5 tablets once daily for 5 days, THEN 1 tablet once daily for 5 days, THEN 0.5 tablets once daily for 5 days.   25 tablet       02/11/2025  03/03/2025 (2/12/2025 12:28 PM)  Is the patient taking all medications as directed (includes completed medication regime)?: -- (Pt states has all meds) (2/12/2025 12:28 PM)  Care Management Interventions: Provided patient education (2/12/2025 12:28 PM)  Medication Comments: Patient unaware of Inhaler Trelegy use, encouarged to call pharmacy for assistance (2/12/2025 12:28 PM)    Appointments  Does the patient have a primary care provider?: Yes (2/12/2025 12:28 PM)  Care Management Interventions: Verified appointment date/time/provider (2/12/2025 12:28 PM)  Care Management Interventions: Advised to schedule with specialist (2/12/2025 12:28 PM)    Self Management  What is the home health agency?: NA (2/12/2025 12:28 PM)  What Durable Medical Equipment (DME) was ordered?: Durable Medical Equipment [facility] [employer] Phone: [phone] Equipment Needed Nebulizer/Oxygen nocturnal (2/12/2025 12:28 PM)  Has all Durable Medical Equipment (DME) been delivered?: Yes (2/12/2025 12:28 PM)    Patient Teaching  Does the patient have access to their discharge instructions?: Yes (2/12/2025 12:28 PM)  Care Management Interventions: Reviewed instructions with patient (2/12/2025 12:28 PM)  What is the patient's perception of their health status since discharge?: Improving (2/12/2025 12:28 PM)  Is the patient/caregiver able to teach back the hierarchy of who to call/visit for symptoms/problems? PCP, Specialist, Home Health nurse, Urgent Care, ED, 911: Yes (2/12/2025 12:28 PM)

## 2025-02-18 ENCOUNTER — APPOINTMENT (OUTPATIENT)
Dept: PRIMARY CARE | Facility: CLINIC | Age: 75
End: 2025-02-18
Payer: COMMERCIAL

## 2025-02-20 ENCOUNTER — PATIENT OUTREACH (OUTPATIENT)
Dept: PRIMARY CARE | Facility: CLINIC | Age: 75
End: 2025-02-20
Payer: COMMERCIAL

## 2025-02-20 NOTE — PROGRESS NOTES
Call regarding  F/U will see PCP 2/28/2025 , has seen Pulmonary today,     At time of outreach call the patient feels as if their condition has improved  since last visit.    Encouraged to call providers for any questions concerns or change in condition.

## 2025-02-26 ENCOUNTER — ANCILLARY PROCEDURE (OUTPATIENT)
Dept: RADIOLOGY | Facility: EXTERNAL LOCATION | Age: 75
End: 2025-02-26
Payer: COMMERCIAL

## 2025-02-26 DIAGNOSIS — M48.062 SPINAL STENOSIS, LUMBAR REGION WITH NEUROGENIC CLAUDICATION: ICD-10-CM

## 2025-02-26 PROCEDURE — 62323 NJX INTERLAMINAR LMBR/SAC: CPT | Performed by: ANESTHESIOLOGY

## 2025-02-26 NOTE — PROGRESS NOTES
Preprocedure diagnosis: Lumbar spinal stenosis with neurogenic claudication  Postprocedure diagnosis: Lumbar spinal stenosis with neurogenic claudication    Procedure performed: L2-3 intralaminar epidural steroid injection under fluoroscopic guidance    Physician: Donte Moreno MD    Anesthesia: The patient received light sedation/anxiolysis for the procedure today.  The patient had a normal response to verbal stimulation throughout the entire procedure.  Airway, ventilation, and cardiovascular systems were unaffected.        Complications: none    Blood loss:  none    Clinical note: This is a very pleasant 74-year-old male who suffers with low back and leg pain here meeting all medical criteria for above-mentioned procedure.    Procedure:      The patient was identified in the preoperative area.  The procedure was discussed in detail including its risks, benefits and alternatives.  Signed consent was obtained and the patient agreed to proceed.    The patient was brought to the [facility] procedure room and was positioned in the prone position onto the procedure room table.  A pillow was placed below the patient's abdomen.  A safety strap was placed across the legs.  The lumbar region was then exposed, prepped and draped in the usual sterile fashion using 2% Chloroprep scrub.  After that, under fluoroscopic guidance in the AP view the L2-3 intralaminar space was identified.  The intended entry point was marked onto the skin and then 10 ml of 2% preservative-free lidocaine was injected using a 25 gauge needle to anesthetize the skin and subcutaneous tissues along the intended needle trajectory.  Next, an 18 Tuohy needle was advanced under intermittent fluoroscopic guidance until bony contact was made with the lamina of L3.  The Tuohy needle was then walked off the lamina in a cephalad manner into the L2-3 intralaminar space.  Using a loss of resistance technique, the epidural space was entered with ease.   Images were taken in the AP and contralateral oblique views.  The stylette was removed from the needle and the needle was aspirated, which was negative for heme and CSF.    Then after another negative aspirate, 10 mg of dexamethasone mixed with 4 mL of preservative-free 0.5% lidocaine was injected via the Tuohy needle.  The needle was then removed and a bandage was applied.  The patient tolerated the procedure well and was transferred back to the discharge area.  The patient was monitored for 15 minutes and vital signs were stable.  The patient was discharged home in stable condition with a .

## 2025-02-28 ENCOUNTER — TELEPHONE (OUTPATIENT)
Dept: PRIMARY CARE | Facility: CLINIC | Age: 75
End: 2025-02-28

## 2025-02-28 ENCOUNTER — APPOINTMENT (OUTPATIENT)
Dept: PRIMARY CARE | Facility: CLINIC | Age: 75
End: 2025-02-28
Payer: COMMERCIAL

## 2025-02-28 VITALS
DIASTOLIC BLOOD PRESSURE: 73 MMHG | OXYGEN SATURATION: 94 % | SYSTOLIC BLOOD PRESSURE: 127 MMHG | TEMPERATURE: 97.8 F | BODY MASS INDEX: 28.06 KG/M2 | HEIGHT: 70 IN | HEART RATE: 55 BPM | WEIGHT: 196 LBS

## 2025-02-28 DIAGNOSIS — E78.5 HYPERLIPIDEMIA, UNSPECIFIED HYPERLIPIDEMIA TYPE: ICD-10-CM

## 2025-02-28 DIAGNOSIS — Z00.00 WELL ADULT EXAM: Primary | ICD-10-CM

## 2025-02-28 DIAGNOSIS — J44.1 COPD EXACERBATION (MULTI): ICD-10-CM

## 2025-02-28 DIAGNOSIS — E13.9 DIABETES MELLITUS OF OTHER TYPE WITHOUT COMPLICATION, UNSPECIFIED WHETHER LONG TERM INSULIN USE (MULTI): ICD-10-CM

## 2025-02-28 DIAGNOSIS — I25.10 CORONARY ARTERY DISEASE INVOLVING NATIVE CORONARY ARTERY OF NATIVE HEART, UNSPECIFIED WHETHER ANGINA PRESENT: ICD-10-CM

## 2025-02-28 DIAGNOSIS — M51.26 DISC DISPLACEMENT, LUMBAR: ICD-10-CM

## 2025-02-28 DIAGNOSIS — E03.9 HYPOTHYROIDISM, UNSPECIFIED TYPE: ICD-10-CM

## 2025-02-28 PROBLEM — M54.50 CHRONIC MIDLINE LOW BACK PAIN WITHOUT SCIATICA: Status: ACTIVE | Noted: 2018-06-19

## 2025-02-28 PROBLEM — G47.30 SLEEP APNEA: Status: ACTIVE | Noted: 2025-02-28

## 2025-02-28 PROBLEM — M79.606 PAIN OF LOWER EXTREMITY: Status: RESOLVED | Noted: 2025-02-28 | Resolved: 2025-02-28

## 2025-02-28 PROBLEM — I10 BENIGN ESSENTIAL HYPERTENSION: Status: ACTIVE | Noted: 2018-08-20

## 2025-02-28 PROBLEM — M79.18 MYOFASCIAL PAIN: Status: RESOLVED | Noted: 2025-02-28 | Resolved: 2025-02-28

## 2025-02-28 PROBLEM — R42 LIGHTHEADEDNESS: Status: RESOLVED | Noted: 2025-02-28 | Resolved: 2025-02-28

## 2025-02-28 PROBLEM — E78.49 OTHER HYPERLIPIDEMIA: Status: RESOLVED | Noted: 2018-08-20 | Resolved: 2025-02-28

## 2025-02-28 PROBLEM — K52.9 CHRONIC DIARRHEA: Status: RESOLVED | Noted: 2021-09-09 | Resolved: 2025-02-28

## 2025-02-28 PROBLEM — D48.5 NEOPLASM OF UNCERTAIN BEHAVIOR OF SKIN: Status: RESOLVED | Noted: 2025-02-28 | Resolved: 2025-02-28

## 2025-02-28 PROBLEM — H61.23 IMPACTED CERUMEN, BILATERAL: Status: RESOLVED | Noted: 2020-06-15 | Resolved: 2025-02-28

## 2025-02-28 PROBLEM — R06.2 WHEEZE: Status: RESOLVED | Noted: 2019-12-12 | Resolved: 2025-02-28

## 2025-02-28 PROBLEM — R19.5 OCCULT BLOOD POSITIVE STOOL: Status: RESOLVED | Noted: 2019-06-27 | Resolved: 2025-02-28

## 2025-02-28 PROBLEM — M65.88: Status: RESOLVED | Noted: 2019-06-08 | Resolved: 2025-02-28

## 2025-02-28 PROBLEM — T81.9XXA COMPLICATION OF SURGICAL PROCEDURE: Status: RESOLVED | Noted: 2025-02-28 | Resolved: 2025-02-28

## 2025-02-28 PROBLEM — K21.9 GASTROESOPHAGEAL REFLUX DISEASE WITHOUT ESOPHAGITIS: Status: RESOLVED | Noted: 2018-08-20 | Resolved: 2025-02-28

## 2025-02-28 PROBLEM — D68.61 ANTIPHOSPHOLIPID ANTIBODY SYNDROME (MULTI): Status: RESOLVED | Noted: 2022-10-19 | Resolved: 2025-02-28

## 2025-02-28 PROBLEM — E55.9 VITAMIN D DEFICIENCY: Status: ACTIVE | Noted: 2025-02-28

## 2025-02-28 PROBLEM — H91.90 HEARING LOSS: Status: ACTIVE | Noted: 2025-02-28

## 2025-02-28 PROBLEM — R07.0 THROAT PAIN: Status: RESOLVED | Noted: 2023-06-05 | Resolved: 2025-02-28

## 2025-02-28 PROBLEM — R93.89 ABNORMAL CT SCAN: Status: RESOLVED | Noted: 2019-10-21 | Resolved: 2025-02-28

## 2025-02-28 PROBLEM — R73.01 IMPAIRED FASTING GLUCOSE: Status: RESOLVED | Noted: 2019-03-18 | Resolved: 2025-02-28

## 2025-02-28 PROBLEM — J18.1 LUNG CONSOLIDATION (CMS-HCC): Status: RESOLVED | Noted: 2023-01-24 | Resolved: 2025-02-28

## 2025-02-28 PROBLEM — M25.562 PAIN IN LEFT KNEE: Status: RESOLVED | Noted: 2018-10-30 | Resolved: 2025-02-28

## 2025-02-28 PROBLEM — R11.0 NAUSEA: Status: RESOLVED | Noted: 2025-02-28 | Resolved: 2025-02-28

## 2025-02-28 PROBLEM — Z86.0100 HISTORY OF COLONIC POLYPS: Status: ACTIVE | Noted: 2025-02-28

## 2025-02-28 PROBLEM — K11.20 SIALOADENITIS: Status: RESOLVED | Noted: 2025-02-28 | Resolved: 2025-02-28

## 2025-02-28 PROBLEM — J18.9 PNEUMONIA DUE TO INFECTIOUS ORGANISM: Status: ACTIVE | Noted: 2025-02-07

## 2025-02-28 PROBLEM — Z91.199: Status: RESOLVED | Noted: 2022-10-20 | Resolved: 2025-02-28

## 2025-02-28 PROBLEM — R97.20 HIGH PROSTATE SPECIFIC ANTIGEN (PSA): Status: RESOLVED | Noted: 2025-02-28 | Resolved: 2025-02-28

## 2025-02-28 PROBLEM — S67.20XA CRUSHING INJURY OF HAND: Status: RESOLVED | Noted: 2025-02-28 | Resolved: 2025-02-28

## 2025-02-28 PROBLEM — F43.10 PTSD (POST-TRAUMATIC STRESS DISORDER): Status: ACTIVE | Noted: 2018-08-20

## 2025-02-28 PROBLEM — L82.1 KERATOSIS, SEBORRHEIC: Status: RESOLVED | Noted: 2019-01-23 | Resolved: 2025-02-28

## 2025-02-28 PROBLEM — G89.29 CHRONIC MIDLINE LOW BACK PAIN WITHOUT SCIATICA: Status: ACTIVE | Noted: 2018-06-19

## 2025-02-28 PROBLEM — R52 PAIN: Status: RESOLVED | Noted: 2025-02-28 | Resolved: 2025-02-28

## 2025-02-28 PROBLEM — D17.1 LIPOMA OF TORSO: Status: RESOLVED | Noted: 2020-06-15 | Resolved: 2025-02-28

## 2025-02-28 PROBLEM — C73 MALIGNANT NEOPLASM OF THYROID GLAND (MULTI): Status: RESOLVED | Noted: 2025-02-28 | Resolved: 2025-02-28

## 2025-02-28 PROBLEM — R68.89 ACTIVITY INTOLERANCE: Status: RESOLVED | Noted: 2025-02-28 | Resolved: 2025-02-28

## 2025-02-28 PROBLEM — H61.20 IMPACTED CERUMEN: Status: RESOLVED | Noted: 2020-06-15 | Resolved: 2025-02-28

## 2025-02-28 PROBLEM — I45.4 BUNDLE-BRANCH BLOCK: Status: ACTIVE | Noted: 2025-02-28

## 2025-02-28 PROBLEM — R05.9 COUGH: Status: RESOLVED | Noted: 2019-11-13 | Resolved: 2025-02-28

## 2025-02-28 PROBLEM — E78.1 PURE HYPERGLYCERIDEMIA: Status: ACTIVE | Noted: 2025-02-28

## 2025-02-28 PROBLEM — K52.9 NONINFECTIOUS INFLAMMATION OF INTESTINE: Status: RESOLVED | Noted: 2021-09-09 | Resolved: 2025-02-28

## 2025-02-28 PROBLEM — R00.1 SINUS BRADYCARDIA: Status: ACTIVE | Noted: 2025-02-28

## 2025-02-28 PROBLEM — D17.1 ABDOMINAL LIPOMA: Status: RESOLVED | Noted: 2019-01-23 | Resolved: 2025-02-28

## 2025-02-28 PROBLEM — J40 BRONCHITIS: Status: RESOLVED | Noted: 2019-11-27 | Resolved: 2025-02-28

## 2025-02-28 PROBLEM — J30.9 ALLERGIC RHINITIS: Status: ACTIVE | Noted: 2023-06-05

## 2025-02-28 PROBLEM — R06.02 SHORTNESS OF BREATH: Status: RESOLVED | Noted: 2023-06-05 | Resolved: 2025-02-28

## 2025-02-28 PROBLEM — Z95.1 HX OF CABG: Status: RESOLVED | Noted: 2024-03-06 | Resolved: 2025-02-28

## 2025-02-28 PROBLEM — M54.2 CERVICALGIA: Status: RESOLVED | Noted: 2025-02-28 | Resolved: 2025-02-28

## 2025-02-28 PROBLEM — E78.00 PURE HYPERCHOLESTEROLEMIA: Status: ACTIVE | Noted: 2025-02-28

## 2025-02-28 PROBLEM — R00.2 PALPITATIONS: Status: ACTIVE | Noted: 2025-02-28

## 2025-02-28 PROBLEM — E66.811 OBESITY, CLASS I, BMI 30-34.9: Status: RESOLVED | Noted: 2022-10-21 | Resolved: 2025-02-28

## 2025-02-28 PROCEDURE — 1123F ACP DISCUSS/DSCN MKR DOCD: CPT | Performed by: FAMILY MEDICINE

## 2025-02-28 PROCEDURE — 1159F MED LIST DOCD IN RCRD: CPT | Performed by: FAMILY MEDICINE

## 2025-02-28 PROCEDURE — 3078F DIAST BP <80 MM HG: CPT | Performed by: FAMILY MEDICINE

## 2025-02-28 PROCEDURE — 1036F TOBACCO NON-USER: CPT | Performed by: FAMILY MEDICINE

## 2025-02-28 PROCEDURE — 99495 TRANSJ CARE MGMT MOD F2F 14D: CPT | Performed by: FAMILY MEDICINE

## 2025-02-28 PROCEDURE — 1158F ADVNC CARE PLAN TLK DOCD: CPT | Performed by: FAMILY MEDICINE

## 2025-02-28 PROCEDURE — 3074F SYST BP LT 130 MM HG: CPT | Performed by: FAMILY MEDICINE

## 2025-02-28 RX ORDER — VITAMIN E (DL,TOCOPHERYL ACET) 90 MG
CAPSULE ORAL
COMMUNITY
Start: 2025-02-11

## 2025-02-28 RX ORDER — FLUTICASONE FUROATE, UMECLIDINIUM BROMIDE AND VILANTEROL TRIFENATATE 100; 62.5; 25 UG/1; UG/1; UG/1
1 POWDER RESPIRATORY (INHALATION)
COMMUNITY
Start: 2025-02-20 | End: 2025-08-19

## 2025-02-28 RX ORDER — UBIQUINOL 100 MG
CAPSULE ORAL
COMMUNITY
Start: 2025-02-11

## 2025-02-28 RX ORDER — LANCETS
EACH MISCELLANEOUS
COMMUNITY
Start: 2025-02-11

## 2025-02-28 RX ORDER — PREDNISONE 20 MG/1
TABLET ORAL
COMMUNITY
Start: 2025-02-11 | End: 2025-03-03

## 2025-02-28 RX ORDER — IPRATROPIUM BROMIDE AND ALBUTEROL SULFATE 2.5; .5 MG/3ML; MG/3ML
3 SOLUTION RESPIRATORY (INHALATION)
COMMUNITY

## 2025-02-28 RX ORDER — BLOOD-GLUCOSE METER
EACH MISCELLANEOUS
COMMUNITY
Start: 2025-02-11

## 2025-02-28 RX ORDER — IBUPROFEN 200 MG
16 TABLET ORAL
COMMUNITY
Start: 2025-02-11

## 2025-02-28 NOTE — PROGRESS NOTES
"Subjective   Patient ID: Justin Brandt is a 74 y.o. male who presents for Hospital Follow-up (TCM F/U [facility] DC 2/7/2025 - 2/11/2025 DX Pneumonia due to infectious organism /Pt still trying to get energy back ).    HPI   Here for hospital follow-up.  Patient was admitted on 2/7/2025 to 2/11/2025.  He was at the [facility] mentor.  He presented to the ED with cough and shortness of breath.  Initial diagnosis was for pneumonia but further investigation did not find any infectious source and it was thought the patient had a COPD flare.  He was taken off antibiotics and put on steroids.  He is using his nebulizer about twice a day.  He is improved and the cough has resolved.  He remains with mild shortness of breath.  While hospitalized it was noticed that his oxygen saturation dropped during sleep.  For this reason patient will be seeing pulmonology as an outpatient for workup for sleep apnea.  He will be getting a follow-up CT chest in about 2 weeks to monitor improvement.  Patient has chronic low back pain.  After 2-year absence patient went back to his pain management specialist and is status post injections.  Quite well in that regard.  Patient has a cardiologist at the [facility].  He is being monitored for his coronary disease.    Review of Systems  Constitutional: Patient is negative for fever, fatigue, weight change.  HEENT: Patient is negative for change in vision, hearing, swallow.  Cardio: Patient is negative for chest pain, lower extremity edema.  Pulmonary: Patient is positive for shortness of breath.  He is negative for cough  Objective   /73 (BP Location: Left arm, Patient Position: Sitting, BP Cuff Size: Adult)   Pulse 55   Temp 36.6 °C (97.8 °F) (Oral)   Ht 1.765 m (5' 9.5\")   Wt 88.9 kg (196 lb)   SpO2 94%   BMI 28.53 kg/m²     Physical Exam  General: Awake and alert in no apparent distress.  HEENT: Moist oral mucosa no cervical lymphadenopathy.  Cardiac: Heart S1-S2 no murmur rub or " gallop.  Pulmonary: Lungs clear to auscultation bilaterally.  Assessment/Plan   Problem List Items Addressed This Visit             ICD-10-CM    Coronary artery disease chronic.  Patient sees cardiology in the near future I25.10    Disc displacement, lumbar stable.  Patient sees pain management. M51.26    COPD exacerbation (Multi) resolving.  Patient has follow-up with pulmonology. J44.1     Other Visit Diagnoses         Codes    Well adult exam    -  Primary Z00.00    Relevant Orders    Follow Up In Primary Care - Health Maintenance

## 2025-03-03 ENCOUNTER — TELEPHONE (OUTPATIENT)
Dept: PAIN MEDICINE | Facility: CLINIC | Age: 75
End: 2025-03-03
Payer: COMMERCIAL

## 2025-03-03 DIAGNOSIS — M54.16 LUMBAR RADICULOPATHY: Primary | ICD-10-CM

## 2025-03-03 RX ORDER — METHYLPREDNISOLONE 4 MG/1
TABLET ORAL
Qty: 21 TABLET | Refills: 0 | Status: SHIPPED | OUTPATIENT
Start: 2025-03-03 | End: 2025-03-09

## 2025-03-03 NOTE — TELEPHONE ENCOUNTER
Patient states he has had many injections by dr dominguez. Had injection on 2/26 and about 24 hours after injection, pain came back and got progressively worse over the weekend. Now feels like pain might be slightly higher than before the injection. Burning pain and sciatic pain down both legs. Pain does lessen when he is sitting but as soon as he does any small activity pain increases. He started taking increased pregabalin 2 tabs bid (states was instructed by dr dominguez). Any other suggestions or advise is appreciated.

## 2025-03-05 ENCOUNTER — PATIENT OUTREACH (OUTPATIENT)
Dept: PRIMARY CARE | Facility: CLINIC | Age: 75
End: 2025-03-05
Payer: COMMERCIAL

## 2025-03-05 NOTE — PROGRESS NOTES
Call regarding  F/U  had seen pain management recently had injection increased pain,has been in touch with pain management., was called in a Model dose pack, patient will continue to monitor and keep pain management updated.     At time of outreach call the patient feels as if he is doing fair      Encouraged patient to call providers for any questions concerns or change in condition.

## 2025-03-07 RX ORDER — BLOOD-GLUCOSE METER
EACH MISCELLANEOUS
OUTPATIENT
Start: 2025-03-07

## 2025-03-17 ENCOUNTER — APPOINTMENT (OUTPATIENT)
Dept: PAIN MEDICINE | Facility: CLINIC | Age: 75
End: 2025-03-17
Payer: COMMERCIAL

## 2025-03-23 ENCOUNTER — HOSPITAL ENCOUNTER (OUTPATIENT)
Dept: RADIOLOGY | Facility: HOSPITAL | Age: 75
Discharge: HOME | End: 2025-03-23
Payer: COMMERCIAL

## 2025-03-23 DIAGNOSIS — M54.16 LUMBAR RADICULOPATHY: ICD-10-CM

## 2025-03-23 PROCEDURE — 72148 MRI LUMBAR SPINE W/O DYE: CPT | Performed by: RADIOLOGY

## 2025-03-23 PROCEDURE — 72148 MRI LUMBAR SPINE W/O DYE: CPT

## 2025-03-31 ENCOUNTER — OFFICE VISIT (OUTPATIENT)
Dept: PAIN MEDICINE | Facility: CLINIC | Age: 75
End: 2025-03-31
Payer: COMMERCIAL

## 2025-03-31 VITALS
RESPIRATION RATE: 16 BRPM | SYSTOLIC BLOOD PRESSURE: 130 MMHG | HEIGHT: 70 IN | DIASTOLIC BLOOD PRESSURE: 66 MMHG | BODY MASS INDEX: 28.06 KG/M2 | WEIGHT: 196 LBS

## 2025-03-31 DIAGNOSIS — M96.1 POSTLAMINECTOMY SYNDROME, LUMBAR REGION: Primary | ICD-10-CM

## 2025-03-31 DIAGNOSIS — M79.10 MYALGIA: ICD-10-CM

## 2025-03-31 DIAGNOSIS — M54.16 LUMBAR RADICULOPATHY: ICD-10-CM

## 2025-03-31 PROCEDURE — 3078F DIAST BP <80 MM HG: CPT | Performed by: ANESTHESIOLOGY

## 2025-03-31 PROCEDURE — 99214 OFFICE O/P EST MOD 30 MIN: CPT | Performed by: ANESTHESIOLOGY

## 2025-03-31 PROCEDURE — 1159F MED LIST DOCD IN RCRD: CPT | Performed by: ANESTHESIOLOGY

## 2025-03-31 PROCEDURE — 1125F AMNT PAIN NOTED PAIN PRSNT: CPT | Performed by: ANESTHESIOLOGY

## 2025-03-31 PROCEDURE — 1036F TOBACCO NON-USER: CPT | Performed by: ANESTHESIOLOGY

## 2025-03-31 PROCEDURE — 1160F RVW MEDS BY RX/DR IN RCRD: CPT | Performed by: ANESTHESIOLOGY

## 2025-03-31 PROCEDURE — 3075F SYST BP GE 130 - 139MM HG: CPT | Performed by: ANESTHESIOLOGY

## 2025-03-31 RX ORDER — CYCLOBENZAPRINE HCL 5 MG
5 TABLET ORAL NIGHTLY
Qty: 15 TABLET | Refills: 0 | Status: SHIPPED | OUTPATIENT
Start: 2025-03-31 | End: 2025-04-15

## 2025-03-31 ASSESSMENT — ENCOUNTER SYMPTOMS
EYES NEGATIVE: 1
RESPIRATORY NEGATIVE: 1
ENDOCRINE NEGATIVE: 1
WEAKNESS: 1
BACK PAIN: 1
ARTHRALGIAS: 1
PSYCHIATRIC NEGATIVE: 1
CARDIOVASCULAR NEGATIVE: 1
CONSTITUTIONAL NEGATIVE: 1
GASTROINTESTINAL NEGATIVE: 1
HEMATOLOGIC/LYMPHATIC NEGATIVE: 1
ALLERGIC/IMMUNOLOGIC NEGATIVE: 1

## 2025-03-31 ASSESSMENT — PAIN DESCRIPTION - DESCRIPTORS: DESCRIPTORS: ACHING

## 2025-03-31 ASSESSMENT — PATIENT HEALTH QUESTIONNAIRE - PHQ9
2. FEELING DOWN, DEPRESSED OR HOPELESS: NOT AT ALL
1. LITTLE INTEREST OR PLEASURE IN DOING THINGS: NOT AT ALL
SUM OF ALL RESPONSES TO PHQ9 QUESTIONS 1 AND 2: 0

## 2025-03-31 ASSESSMENT — PAIN SCALES - GENERAL
PAINLEVEL_OUTOF10: 9
PAINLEVEL_OUTOF10: 9

## 2025-03-31 ASSESSMENT — PAIN - FUNCTIONAL ASSESSMENT: PAIN_FUNCTIONAL_ASSESSMENT: 0-10

## 2025-03-31 NOTE — PROGRESS NOTES
Subjective   Patient ID: Justin Brandt is a 74 y.o. male who presents for Back Pain.  Back Pain  Associated symptoms include weakness.   Patient here today for follow up today for his low back and leg pain left > right.  His pain is a 10/10 on the left.  He has left sidede back pain and then down the anterior lateral thigh just below the knee.  He reports that the right leg is half as much pain. He has pain in all positions.  He gets severe pain at night when he is sleeping and trying to maneuver in better.  Flat on his back is sometimes the best position.  Patient has had this pain in which started about 3 to 4 days after his last epidural.  Normally he does excellent with his epidurals over the last several years.  He gets 1 to 2/year which last for 6 months for him.  However ever since he has had severe radicular leg pain and back spasm.  He does feel that the nerves in his back may have been irritated by the procedure.  He did finish a Medrol Dosepak which did dull the pain down for him for several days but since being off of it for several weeks the pain has returned back to the level.  He is using a cane for ambulation.  ADLs have become much more challenging for him such as dressing and showering.  He would like to avoid having any surgery but is open for surgical consultation.  He did have a new MRI which she like to go over today.  He is open to having a another intervention completed.  He reports no weakness or numbness.  He has a history of a femoral nerve neuropathy as well.      Review of Systems   Constitutional: Negative.    HENT: Negative.     Eyes: Negative.    Respiratory: Negative.     Cardiovascular: Negative.    Gastrointestinal: Negative.    Endocrine: Negative.    Genitourinary: Negative.    Musculoskeletal:  Positive for arthralgias, back pain and gait problem.   Skin: Negative.    Allergic/Immunologic: Negative.    Neurological:  Positive for weakness.   Hematological: Negative.     Psychiatric/Behavioral: Negative.         Objective   Physical Exam  Constitutional:       Appearance: Normal appearance. He is normal weight.   HENT:      Head: Normocephalic and atraumatic.      Nose: Nose normal.      Mouth/Throat:      Mouth: Mucous membranes are moist.      Pharynx: Oropharynx is clear.   Eyes:      Conjunctiva/sclera: Conjunctivae normal.      Pupils: Pupils are equal, round, and reactive to light.   Cardiovascular:      Rate and Rhythm: Normal rate.      Pulses: Normal pulses.   Pulmonary:      Effort: Pulmonary effort is normal. No respiratory distress.   Musculoskeletal:      Cervical back: Normal.      Thoracic back: Normal.      Lumbar back: No spasms or tenderness. Decreased range of motion. Negative right straight leg raise test and negative left straight leg raise test.        Back:    Skin:     General: Skin is warm and dry.   Neurological:      Mental Status: He is alert and oriented to person, place, and time.      Sensory: Sensation is intact.      Motor: Motor function is intact. No weakness.      Coordination: Coordination abnormal.      Gait: Gait abnormal.      Deep Tendon Reflexes:      Reflex Scores:       Patellar reflexes are 2+ on the right side and 1+ on the left side.     Comments: Patient walks with lumbar flexed gait.  Patient ambulating with cane.   Psychiatric:         Mood and Affect: Mood normal.         Assessment/Plan   Problem List Items Addressed This Visit    None  Visit Diagnoses         Codes    Postlaminectomy syndrome, lumbar region    -  Primary M96.1    Lumbar radiculopathy     M54.16             I nice discussion with the patient today our plan will be as follows.    Radiology: FINDINGS:  The marrow signal and vertebral body height are normal. The conus and  sacrum are normal. Images at each interspace reveal the following:  T12/L1  Mild facet hypertrophy without canal or foraminal narrowing  L1/L2  Mild bulging disc and facet hypertrophy without  canal or foraminal  narrowing L2/L3  Mild bulging disc and facet hypertrophy. Flattening of the thecal sac  which measures a proximally 8 mm in AP dimension in the midline.  Advanced bilateral foraminal narrowing. Decreased size of the  previously demonstrated midline herniation in the form of a 3 mm x 7  mm flattened disc fragment inferior to the intervertebral disc space  further flattening of the thecal sac and best appreciated on axial T2  weighted image 19/43. L3/L4  Previous interbody fusion and anterior fixation with bilateral facet  hypertrophy. Circumferential bulging intervertebral disc to the left  with superimposed far lateral herniated nucleus polyposis measuring a  proximally 1 cm x 2 cm in size on axial T2 weighted image 5/43.  Flattening of the thecal sac with mild canal stenosis.  Moderate/advanced foraminal narrowing. L4/L5  Previous laminectomy with pedicle screw and plate fixation. No  residual canal stenosis. Bilateral facet hypertrophy. Right worse  than left foraminal narrowing without focal disc herniation. L5/S1  Bilateral facet hypertrophy without canal stenosis. Moderate/advanced  bilateral foraminal narrowing.          IMPRESSION:  * Postoperative changes and spondylosis unchanged from the previous  exam.    Physically: Hold off on any physical therapy at this time as pain levels are extremely high.    Psychologically:  No issues at this time.     Medication: Flexeril 5 mg nightly provide for 15 days.    Duration:  2 months    Intervention:  Pateint with severe acute lumbar radiculopathy.  Patient with severe pain left leg over right.  Walking and ADLs have become very challenging for him.  I do recommend moving forward with a L2-3 interlaminar epidural steroid injection or fluoroscopic guidance ASAP.  He has responded to this in the past.  Oral steroids did dull the pain only for short period of time.  New MRI has been completed which shows no acute disc herniations.  Patient does have  worsening moderate to severe central canal stenosis at L2-3.  Risks, benefit, and alternatives of the procedure were discussed with the patient.  Oswestry score has been compelted and recorded.        Please note that this report has been produced using speech recognition software. It may contain errors related to grammar, punctuation or spelling. Electronically signed, but not reviewed.       Donte Moreno MD 03/31/25 8:10 AM

## 2025-04-13 DIAGNOSIS — E11.9 TYPE 2 DIABETES MELLITUS WITHOUT COMPLICATION, WITHOUT LONG-TERM CURRENT USE OF INSULIN: Primary | ICD-10-CM

## 2025-04-14 DIAGNOSIS — M54.16 LUMBAR RADICULOPATHY: ICD-10-CM

## 2025-04-14 RX ORDER — BLOOD-GLUCOSE METER
EACH MISCELLANEOUS
Qty: 180 EACH | Refills: 3 | Status: SHIPPED | OUTPATIENT
Start: 2025-04-14

## 2025-04-15 RX ORDER — PREGABALIN 100 MG/1
100 CAPSULE ORAL 2 TIMES DAILY
Qty: 60 CAPSULE | Refills: 3 | Status: SHIPPED | OUTPATIENT
Start: 2025-04-15

## 2025-04-16 DIAGNOSIS — M79.10 MYALGIA: ICD-10-CM

## 2025-04-16 DIAGNOSIS — M54.16 LUMBAR RADICULOPATHY: ICD-10-CM

## 2025-04-16 NOTE — TELEPHONE ENCOUNTER
Pt called requesting a refill of cyclobenzaprine. Pt stated that medication has helped him with sleep and has allowed him to get a good couple of hours each night.

## 2025-04-17 RX ORDER — CYCLOBENZAPRINE HCL 5 MG
5 TABLET ORAL NIGHTLY
Qty: 15 TABLET | Refills: 0 | Status: SHIPPED | OUTPATIENT
Start: 2025-04-17 | End: 2025-05-02

## 2025-04-18 RX ORDER — CYCLOBENZAPRINE HCL 5 MG
5 TABLET ORAL NIGHTLY
Qty: 15 TABLET | Refills: 0 | Status: SHIPPED | OUTPATIENT
Start: 2025-04-18 | End: 2025-05-03

## 2025-04-24 DIAGNOSIS — M48.062 SPINAL STENOSIS OF LUMBAR REGION WITH NEUROGENIC CLAUDICATION: ICD-10-CM

## 2025-04-24 DIAGNOSIS — M54.16 LUMBAR RADICULOPATHY: Primary | ICD-10-CM

## 2025-04-24 RX ORDER — PREDNISONE 20 MG/1
TABLET ORAL
Qty: 12 TABLET | Refills: 0 | Status: SHIPPED | OUTPATIENT
Start: 2025-04-24 | End: 2025-05-13

## 2025-04-29 ENCOUNTER — PATIENT OUTREACH (OUTPATIENT)
Dept: PRIMARY CARE | Facility: CLINIC | Age: 75
End: 2025-04-29
Payer: COMMERCIAL

## 2025-04-29 NOTE — PROGRESS NOTES
Unable to reach patient for a F/U call     Left voicemail with call back number for patient to call if needed   If no voicemail available call attempts x 2 were made to contact the patient to assist with any questions or concerns patient may have.     L/M Encouraged patient to call providers for any questions concerns or change in condition.

## 2025-05-05 DIAGNOSIS — M79.10 MYALGIA: ICD-10-CM

## 2025-05-05 DIAGNOSIS — M54.16 LUMBAR RADICULOPATHY: ICD-10-CM

## 2025-05-05 RX ORDER — CYCLOBENZAPRINE HCL 5 MG
5 TABLET ORAL NIGHTLY
Qty: 15 TABLET | Refills: 0 | Status: SHIPPED | OUTPATIENT
Start: 2025-05-05 | End: 2025-05-20

## 2025-05-09 ENCOUNTER — TELEPHONE (OUTPATIENT)
Dept: PAIN MEDICINE | Facility: CLINIC | Age: 75
End: 2025-05-09
Payer: COMMERCIAL

## 2025-05-09 NOTE — TELEPHONE ENCOUNTER
PATIENT LEFT MESSAGE REGARDING DENIAL OF HIS PROCEDURE. HE STATES HE SPOKE WITH HIS INSURANCE AND THEY STATED THAT IT WAS TOO SOON TO HAVE INJECTION WHICH IS WHY IT WAS DENIED AND THEY STATED THAT THEY WOULD APPROVE IT AFTER MAY 26TH. PATIENT WOULD LIKE TO SCHEDULE SO THAT HE CAN HAVE THE INJECTION as SOON AFTER MAY 26TH as POSSIBLE

## 2025-05-19 DIAGNOSIS — M79.10 MYALGIA: ICD-10-CM

## 2025-05-19 DIAGNOSIS — E13.9 DIABETES MELLITUS OF OTHER TYPE WITHOUT COMPLICATION, UNSPECIFIED WHETHER LONG TERM INSULIN USE: ICD-10-CM

## 2025-05-19 DIAGNOSIS — E03.9 HYPOTHYROIDISM, UNSPECIFIED TYPE: ICD-10-CM

## 2025-05-19 DIAGNOSIS — M54.16 LUMBAR RADICULOPATHY: ICD-10-CM

## 2025-05-19 DIAGNOSIS — E78.5 HYPERLIPIDEMIA, UNSPECIFIED HYPERLIPIDEMIA TYPE: ICD-10-CM

## 2025-05-19 RX ORDER — CYCLOBENZAPRINE HCL 5 MG
5 TABLET ORAL NIGHTLY
Qty: 15 TABLET | Refills: 0 | Status: SHIPPED | OUTPATIENT
Start: 2025-05-19 | End: 2025-06-03

## 2025-05-20 ENCOUNTER — APPOINTMENT (OUTPATIENT)
Dept: NEUROSURGERY | Facility: CLINIC | Age: 75
End: 2025-05-20
Payer: COMMERCIAL

## 2025-05-20 VITALS
HEART RATE: 63 BPM | SYSTOLIC BLOOD PRESSURE: 146 MMHG | DIASTOLIC BLOOD PRESSURE: 77 MMHG | WEIGHT: 180.34 LBS | HEIGHT: 69 IN | RESPIRATION RATE: 16 BRPM | BODY MASS INDEX: 26.71 KG/M2

## 2025-05-20 DIAGNOSIS — M54.16 LUMBAR RADICULOPATHY: ICD-10-CM

## 2025-05-20 DIAGNOSIS — M48.062 SPINAL STENOSIS OF LUMBAR REGION WITH NEUROGENIC CLAUDICATION: ICD-10-CM

## 2025-05-20 PROCEDURE — 3077F SYST BP >= 140 MM HG: CPT | Performed by: NEUROLOGICAL SURGERY

## 2025-05-20 PROCEDURE — 99202 OFFICE O/P NEW SF 15 MIN: CPT | Performed by: NEUROLOGICAL SURGERY

## 2025-05-20 PROCEDURE — 3078F DIAST BP <80 MM HG: CPT | Performed by: NEUROLOGICAL SURGERY

## 2025-05-20 PROCEDURE — 1036F TOBACCO NON-USER: CPT | Performed by: NEUROLOGICAL SURGERY

## 2025-05-20 PROCEDURE — 1159F MED LIST DOCD IN RCRD: CPT | Performed by: NEUROLOGICAL SURGERY

## 2025-05-20 PROCEDURE — 1125F AMNT PAIN NOTED PAIN PRSNT: CPT | Performed by: NEUROLOGICAL SURGERY

## 2025-05-20 ASSESSMENT — ENCOUNTER SYMPTOMS
OCCASIONAL FEELINGS OF UNSTEADINESS: 1
BRUISES/BLEEDS EASILY: 1
CONSTITUTIONAL NEGATIVE: 1
APNEA: 1
ENDOCRINE NEGATIVE: 1
LIGHT-HEADEDNESS: 1
DEPRESSION: 1
DIZZINESS: 1
PSYCHIATRIC NEGATIVE: 1
EYES NEGATIVE: 1
NUMBNESS: 1
LOSS OF SENSATION IN FEET: 0
GASTROINTESTINAL NEGATIVE: 1
BACK PAIN: 1
CARDIOVASCULAR NEGATIVE: 1
WEAKNESS: 1

## 2025-05-20 ASSESSMENT — PAIN SCALES - GENERAL: PAINLEVEL_OUTOF10: 6

## 2025-05-20 NOTE — PROGRESS NOTES
"Having ache , stabbing and burning pain in the back and down both legs to mid calf. Has numbness and tingling in his feet at times. This has been for 6 months. Has not had PT and has had pain management.   Has had 3 surgeries on his 2016, 2018 lumbar and 2006 cervical spine. Triple bypass 2020.    74-year-old man presents for evaluation of leg pain.  His past medical history significant for 2 lumbar spine surgeries in 2016 2018.  He reports that after the second surgery he had persistent severe numbness in his left leg.  That has not improved.  He also feels that the leg gives out on him.  More recently however, he has developed a shooting pain going from the left hip down to just below the left knee.  He denies any symptoms on the right side.  He describes the pain as severe and debilitating.  He has not had any improvement from injections.    Review of Systems   Constitutional: Negative.    HENT:  Positive for hearing loss.    Eyes: Negative.    Respiratory:  Positive for apnea.    Cardiovascular: Negative.    Gastrointestinal: Negative.    Endocrine: Negative.    Genitourinary: Negative.    Musculoskeletal:  Positive for back pain.   Skin: Negative.    Allergic/Immunologic: Positive for environmental allergies.   Neurological:  Positive for dizziness, weakness, light-headedness and numbness.   Hematological:  Bruises/bleeds easily.   Psychiatric/Behavioral: Negative.         Visit Vitals  /77 (BP Location: Right arm, Patient Position: Sitting, BP Cuff Size: Adult)   Pulse 63   Resp 16   Ht 1.74 m (5' 8.5\")   Wt 81.8 kg (180 lb 5.4 oz)   BMI 27.02 kg/m²   Smoking Status Former   BSA 1.99 m²         Current Medications[1]      Objective   Neurological Exam    On physical exam, the patient is alert and interactive.  Range of motion at the waist is limited by pain.  Strength on the right is full.  On the left quadricep is 4 out of 5.  He is 5 out of 5 distally.    A new MRI of his lumbar spine that I personally " reviewed rates postsurgical changes with an L4-5 decompressive laminectomy and fusion.  At L3-4 there has been a lateral discectomy with interbody graft.  Degenerative disease results in severe canal stenosis at L2-3 and L3-4.    I discussed these findings with the patient.  He is potentially a candidate for an L2-3 decompressive laminectomy.  We reviewed the risks and benefits of this today.  The biggest problem is that is unclear if the severe pain he is experiencing is from nerve root compressions at the levels where there is stenosis or if it is from nerve irritation from one of his prior surgeries.  Given this uncertainty, another option might be for him to consider spinal cord stimulation as this would potentially address the pain regardless of the cause.  The patient wishes to pursue this.  Will refer him for evaluation.         [1]   Current Outpatient Medications:     acetaminophen (Tylenol) 325 mg tablet, Take by mouth every 6 hours if needed., Disp: , Rfl:     Alcohol Prep Pads pads, medicated, USE WITH BLOOD GLUCOSE TEST 2 TIMES DAILY., Disp: , Rfl:     amLODIPine (Norvasc) 5 mg tablet, Take 1 tablet (5 mg) by mouth once daily., Disp: , Rfl:     aspirin 81 mg chewable tablet, once every 24 hours., Disp: , Rfl:     atorvastatin (Lipitor) 80 mg tablet, TAKE 1 TABLET BY MOUTH ONCE DAILY., Disp: 90 tablet, Rfl: 3    blood sugar diagnostic strip, Use with blood glucose test 2 times daily, Insulin Dep? No, Disp: , Rfl:     blood-glucose meter (Contour Next Gen Meter) misc, TEST TWO TIMES A DAY, Disp: 180 each, Rfl: 3    cholecalciferol (Vitamin D-3) 5,000 Units tablet, , Disp: , Rfl:     cyclobenzaprine (Flexeril) 5 mg tablet, Take 1 tablet (5 mg) by mouth once daily at bedtime for 15 days., Disp: 15 tablet, Rfl: 0    cyclobenzaprine (Flexeril) 5 mg tablet, Take 1 tablet (5 mg) by mouth once daily at bedtime for 15 days., Disp: 15 tablet, Rfl: 0    ezetimibe (Zetia) 10 mg tablet, Take 1 tablet (10 mg) by mouth  once daily., Disp: , Rfl:     fluticasone (Flonase) 50 mcg/actuation nasal spray, 1 spray by Does not apply route., Disp: , Rfl:     glimepiride (Amaryl) 1 mg tablet, TAKE 1 TABLET (1 MG) BY MOUTH ONCE DAILY IN THE MORNING. TAKE BEFORE MEALS., Disp: 90 tablet, Rfl: 3    glucose 4 gram chewable tablet, Chew 4 tablets (16 g)., Disp: , Rfl:     ipratropium-albuteroL (Duo-Neb) 0.5-2.5 mg/3 mL nebulizer solution, Take 3 mL by nebulization every 6 hours., Disp: , Rfl:     metoprolol succinate XL (Toprol-XL) 100 mg 24 hr tablet, Take 1 tablet (100 mg) by mouth once daily., Disp: , Rfl:     Microlet Lancet misc, USE WITH BLOOD GLUCOSE TEST 2 TIMES DAILY. INSULIN DEP? NO, Disp: , Rfl:     multivitamin tablet, Take 1 tablet by mouth once daily., Disp: , Rfl:     Needle Collection and Disposal, FOR DISPOSAL OF INSULIN NEEDLES, LANCETS, AND OTHER SHARP OBJECTS) FOR UP TO 1 DAY., Disp: , Rfl:     omeprazole (PriLOSEC) 20 mg DR capsule, TAKE 1 CAPSULE BY MOUTH EVERY DAY, Disp: 90 capsule, Rfl: 3    oxygen (O2) gas therapy, Inhale 1 each continuously. Only at night, Disp: , Rfl:     pregabalin (Lyrica) 100 mg capsule, TAKE 1 CAPSULE BY MOUTH TWICE A DAY, Disp: 60 capsule, Rfl: 3    pregabalin (Lyrica) 50 mg capsule, Take 1 capsule (50 mg) by mouth 3 times a day., Disp: 90 capsule, Rfl: 1    Synthroid 150 mcg tablet, TAKE 1 TABLET DAILY ON AN EMPTY STOMACH FOR HYPOTHYROIDISM, Disp: 90 tablet, Rfl: 3    Trelegy Ellipta 100-62.5-25 mcg blister with device, Inhale 1 puff once daily., Disp: , Rfl:     Vascepa 1 gram capsule, Take 2 capsules (2 g) by mouth 2 times daily (morning and late afternoon)., Disp: , Rfl:

## 2025-05-22 ENCOUNTER — PREP FOR PROCEDURE (OUTPATIENT)
Dept: PAIN MEDICINE | Facility: CLINIC | Age: 75
End: 2025-05-22
Payer: COMMERCIAL

## 2025-05-22 DIAGNOSIS — M54.16 LUMBAR RADICULOPATHY: Primary | ICD-10-CM

## 2025-06-04 ENCOUNTER — TELEPHONE (OUTPATIENT)
Dept: PRIMARY CARE | Facility: CLINIC | Age: 75
End: 2025-06-04

## 2025-06-04 ENCOUNTER — APPOINTMENT (OUTPATIENT)
Dept: PRIMARY CARE | Facility: CLINIC | Age: 75
End: 2025-06-04
Payer: COMMERCIAL

## 2025-06-04 VITALS
WEIGHT: 195 LBS | OXYGEN SATURATION: 98 % | SYSTOLIC BLOOD PRESSURE: 156 MMHG | DIASTOLIC BLOOD PRESSURE: 80 MMHG | HEART RATE: 62 BPM | TEMPERATURE: 97.5 F | HEIGHT: 69 IN | BODY MASS INDEX: 28.88 KG/M2

## 2025-06-04 DIAGNOSIS — M54.50 CHRONIC MIDLINE LOW BACK PAIN WITHOUT SCIATICA: ICD-10-CM

## 2025-06-04 DIAGNOSIS — E13.9 DIABETES MELLITUS OF OTHER TYPE WITHOUT COMPLICATION, UNSPECIFIED WHETHER LONG TERM INSULIN USE: Primary | ICD-10-CM

## 2025-06-04 DIAGNOSIS — E78.5 HYPERLIPIDEMIA, UNSPECIFIED HYPERLIPIDEMIA TYPE: ICD-10-CM

## 2025-06-04 DIAGNOSIS — G89.29 CHRONIC MIDLINE LOW BACK PAIN WITHOUT SCIATICA: ICD-10-CM

## 2025-06-04 DIAGNOSIS — E03.9 HYPOTHYROIDISM, UNSPECIFIED TYPE: ICD-10-CM

## 2025-06-04 DIAGNOSIS — I25.10 CORONARY ARTERY DISEASE INVOLVING NATIVE HEART, UNSPECIFIED VESSEL OR LESION TYPE, UNSPECIFIED WHETHER ANGINA PRESENT: ICD-10-CM

## 2025-06-04 DIAGNOSIS — Z00.00 WELL ADULT EXAM: ICD-10-CM

## 2025-06-04 DIAGNOSIS — K21.9 GASTROESOPHAGEAL REFLUX DISEASE, UNSPECIFIED WHETHER ESOPHAGITIS PRESENT: ICD-10-CM

## 2025-06-04 DIAGNOSIS — I10 BENIGN ESSENTIAL HYPERTENSION: ICD-10-CM

## 2025-06-04 DIAGNOSIS — E13.9 DIABETES MELLITUS OF OTHER TYPE WITHOUT COMPLICATION, UNSPECIFIED WHETHER LONG TERM INSULIN USE: ICD-10-CM

## 2025-06-04 LAB
POC APPEARANCE, URINE: CLEAR
POC BILIRUBIN, URINE: NEGATIVE
POC BLOOD, URINE: NEGATIVE
POC COLOR, URINE: YELLOW
POC GLUCOSE, URINE: NEGATIVE MG/DL
POC KETONES, URINE: NEGATIVE MG/DL
POC LEUKOCYTES, URINE: NEGATIVE
POC NITRITE,URINE: NEGATIVE
POC PH, URINE: 5.5 PH
POC PROTEIN, URINE: NEGATIVE MG/DL
POC SPECIFIC GRAVITY, URINE: 1.02
POC UROBILINOGEN, URINE: 0.2 EU/DL

## 2025-06-04 PROCEDURE — 1159F MED LIST DOCD IN RCRD: CPT | Performed by: FAMILY MEDICINE

## 2025-06-04 PROCEDURE — G0439 PPPS, SUBSEQ VISIT: HCPCS | Performed by: FAMILY MEDICINE

## 2025-06-04 PROCEDURE — 3079F DIAST BP 80-89 MM HG: CPT | Performed by: FAMILY MEDICINE

## 2025-06-04 PROCEDURE — 1036F TOBACCO NON-USER: CPT | Performed by: FAMILY MEDICINE

## 2025-06-04 PROCEDURE — 1125F AMNT PAIN NOTED PAIN PRSNT: CPT | Performed by: FAMILY MEDICINE

## 2025-06-04 PROCEDURE — 81003 URINALYSIS AUTO W/O SCOPE: CPT | Performed by: FAMILY MEDICINE

## 2025-06-04 PROCEDURE — 3077F SYST BP >= 140 MM HG: CPT | Performed by: FAMILY MEDICINE

## 2025-06-04 ASSESSMENT — ENCOUNTER SYMPTOMS
LOSS OF SENSATION IN FEET: 0
OCCASIONAL FEELINGS OF UNSTEADINESS: 0
DEPRESSION: 0

## 2025-06-04 ASSESSMENT — PAIN SCALES - GENERAL: PAINLEVEL_OUTOF10: 10-WORST PAIN EVER

## 2025-06-04 NOTE — PROGRESS NOTES
Subjective   Patient ID: Justin Brandt is a 74 y.o. male who presents for Annual Exam (EKG  sees cardiology  Dr. Montilla at [facility]/Colonoscopy  2/2024  per Dr. Ornelas repeat 5 years/Prevnar 13   12/2011   Pneumo 12/2016 [facility]- [facility]Labs-  ordered but patient did not get them/U/A today in the office today is NEG/Lipid panel done at [facility] yesterday-  patient has results//[facility]/Patient is not on Medicare).    HPI The patient presents for a comprehensive physical exam. PMH, PSH, family history and social history were reviewed and update He will discuss AAA screening with his cardiologist. He had HIV and hepatitis C screening completed in 2014. The results were negative.   Patient has significant back pain.  He is status post multiple surgeries.  He sees pain management.  He is awaiting injections at this time.  Patient has coronary disease.  He is status post CABG in 2022.  He sees cardiology at the [facility].  Pt has Hx Hyperkalemia: noted in the past and in the recent labs, asymptomatic, will recheck potassium level, admits to eating a lot of artichokes.  Pt has DM type 2: controlled, compliant with medication, follows a DM diet, walks dog 3-4 days a week, no other exercise routine due to chronic back pain, does not check glucose at home often, DM eye exam completed in March without DM changes, has never had a DM foot exam, urine microalbumin test is overdue.   Pt has HTN: controlled, compliant with medication, under the care of Cardiology at [facility] (seen every 6 months), checks BP at home and average is 130s/70s, does not follow a low salt diet, exercises 3-4 days a week.   Pt has Hyperlipidemia: controlled, , compliant with medication, follows a low fat diet, exercises, needs refill.   Pt has Hypothyroidism: controlled, TSH has not been done recently.  Pt has Lipomas of the chest: has had them for a few years, one below left  breast is growing and causing discomfort, one in the left axillary line is growing and causing pain, patient requests a surgical consultation.   Pt has GERD with esophagitis: EGD completed 5 years ago and will be repeated next month, needs refill for omeprazole.   Patient had a colonoscopy done in 2024.  Patient immunized gets coronavirus x 3.  He has been immunized against pneumococcal disease x 2.  Patient has not had a recent influenza physician or tetanus sensation.  Patient does not use tobacco   Patient does not use alcohol    Review of Systems  GENERAL: denies lack of energy, unexplained weight gain or weight loss, loss of appetite, fever, night sweats.  HEENT: denies sinus problems, runny nose, post-nasal drip, ringing in ears, mouth sores, loose teeth, ear pain, nosebleeds, sore throat, facial pain or numbness, admits to difficulty with hearing.  CV: denies irregular heartbeat, racing heart, chest pains, swelling of feet or legs, pain in legs with walking.  RESPIRATORY: denies shortness of breath, prolonged cough, wheezing, sputum production, prior tuberculosis, pleurisy, oxygen at home, coughing up blood, abnormal chest x-ray.  GI: Patient is positive for change in stool quantity and quality. denies heartburn, constipation, intolerance to certain foods, diarrhea, abdominal pain, difficulty swallowing, nausea, vomiting, blood in stools, unexplained change in bowel habits, incontinence.  : denies painful urination, frequent urination, urgency, bladder problems, prostate problems, impotence.  MS: denies joint pain, aching muscles, swelling of joints, joint deformities, admits to back pain.  INTEGUMENT: denies persistent rash, itching, new skin lesion, hair loss or increase, admits to change in existing skin lesions.  NEUROLOGIC: denies frequent headaches, double vision, weakness, change in sensation, problems with walking or balance, dizziness, tremor, loss of consciousness, uncontrolled motions, episodes  "of visual loss.  PSYCHIATRIC: denies insomnia, irritability, depression, anxiety, recurrent bad thoughts.  ENDOCRINOLOGIC: denies intolerance to heat or cold, frequent hunger/urination/thirst.  HEMATOLOGIC: denies easy bleeding, easy bruising, anemia, leukemia, unexplained swollen areas.  ALLERGIC/IMMUNOLOGIC: denies seasonal allergies, hay fever symptoms, itching, frequent infections, exposure to HIV.     Objective   /80 (BP Location: Left arm)   Pulse 62   Temp 36.4 °C (97.5 °F)   Ht 1.74 m (5' 8.5\")   Wt 88.5 kg (195 lb)   SpO2 98%   BMI 29.22 kg/m²     Physical Exam  General appearance: Well developed, overweight, in no acute distress.  Skin: Inspection of the skin reveals no rashes, ulceration, or petechiae. There are multiple lipomas on the trunk in the chest area. Two are tender with palpation: below the left breast and right axillary line.   HEENT: The sclerae were anicteric and conjunctivae were pink and moist. Extraocular movements were intact and pupils were equal, round, and reactive to light with normal accommodation. External inspection of the ears and nose showed no scars, lesions, or masses. EACs clear, TMs translucent, ossicles normal appearance, hearing intact. Nasal mucosa non-inflamed, turbinates normal. Lips, teeth, and gums showed normal mucosa. The oral mucosa, hard and soft palate, tongue and posterior pharynx were normal.  Neck: Supple and symmetric. There was no thyroid enlargement, and no tenderness, or masses were felt.   Lungs: Auscultation of the lungs revealed normal breath sounds without any other adventitious sounds.  Cardiovascular: There was a regular rate and rhythm without any murmurs, gallops, or rubs. There were no carotid bruits. Peripheral pulses were 2+ and symmetric. Brisk capillary refill.  Abdomen: Soft and nontender with normal bowel sounds. The liver was not enlarged or tender. The spleen was not palpable. No ascites was noted.  Lymph nodes: No " lymphadenopathy was appreciated in the neck.  Musculoskeletal: There was no tenderness or effusions noted. Muscle strength and tone were normal.   Extremities: No cyanosis, clubbing, or edema.  Neurologic: Alert and oriented x 3. Normal affect. Normal deep tendon reflexes with no pathological reflexes. Sensation to touch was normal.   Rectal: Deferred.   Breasts: Deferred.  Genital: Deferred.  DM foot exam: Skin temperature normal. Skin intact. Pulses normal. Monofilament test normal.     Assessment/Plan   Problem List Items Addressed This Visit           ICD-10-CM    Coronary artery disease chronic.  Sees cardiology. I25.10    Diabetes mellitus (Multi) - Primary stable. E11.9    Relevant Orders    Follow Up In Primary Care - Established    Hypothyroid needs workup.  Patient has not had TSH recently will have 1 done soon. E03.9    Relevant Orders    Tsh With Reflex To Free T4 If Abnormal    Follow Up In Primary Care - Established    Chronic midline low back pain without sciatica chronic.  Patient sees pain management. M54.50, G89.29     Other Visit Diagnoses         Codes      Hyperlipidemia, unspecified hyperlipidemia type    chronic, stable. E78.5    Relevant Orders    POCT UA Automated manually resulted (Completed)      Well adult exam    normal exam. Z00.00      Gastroesophageal reflux disease, unspecified whether esophagitis present    chronic, stable. K21.9

## 2025-06-05 DIAGNOSIS — M79.10 MYALGIA: ICD-10-CM

## 2025-06-05 DIAGNOSIS — M54.16 LUMBAR RADICULOPATHY: ICD-10-CM

## 2025-06-05 RX ORDER — CYCLOBENZAPRINE HCL 5 MG
5 TABLET ORAL NIGHTLY
Qty: 15 TABLET | Refills: 0 | Status: SHIPPED | OUTPATIENT
Start: 2025-06-05 | End: 2025-06-20

## 2025-06-10 ENCOUNTER — OFFICE VISIT (OUTPATIENT)
Dept: PRIMARY CARE | Facility: CLINIC | Age: 75
End: 2025-06-10
Payer: COMMERCIAL

## 2025-06-10 VITALS
HEART RATE: 59 BPM | TEMPERATURE: 97.3 F | DIASTOLIC BLOOD PRESSURE: 65 MMHG | WEIGHT: 196 LBS | BODY MASS INDEX: 29.03 KG/M2 | OXYGEN SATURATION: 92 % | SYSTOLIC BLOOD PRESSURE: 111 MMHG | HEIGHT: 69 IN

## 2025-06-10 DIAGNOSIS — R21 LOCALIZED MACULAR RASH: Primary | ICD-10-CM

## 2025-06-10 PROCEDURE — 3078F DIAST BP <80 MM HG: CPT | Performed by: FAMILY MEDICINE

## 2025-06-10 PROCEDURE — 3074F SYST BP LT 130 MM HG: CPT | Performed by: FAMILY MEDICINE

## 2025-06-10 PROCEDURE — 99212 OFFICE O/P EST SF 10 MIN: CPT | Performed by: FAMILY MEDICINE

## 2025-06-10 RX ORDER — ALBUTEROL SULFATE 90 UG/1
INHALANT RESPIRATORY (INHALATION)
COMMUNITY
Start: 2025-04-08

## 2025-06-10 NOTE — PROGRESS NOTES
"Subjective   Patient ID: Justin Brandt is a 74 y.o. male who presents for Insect Bite (Pt has possible bug/spider bite on right arm its red and purple around iarea/Pt noticed it yesterday morning no pain or itchiness ).    Skin infection: noticed yesterday morning, possible bug or spider bite, right arm affected, red and purple present, denies pain, itching, and fever.      Review of Systems   Skin:  Positive for rash.     Objective   /65 (BP Location: Left arm, Patient Position: Sitting, BP Cuff Size: Adult)   Pulse 59   Temp 36.3 °C (97.3 °F) (Temporal)   Ht 1.74 m (5' 8.5\")   Wt 88.9 kg (196 lb)   SpO2 92%   BMI 29.37 kg/m²     Physical Exam  Vitals and nursing note reviewed.   Constitutional:       General: He is not in acute distress.     Appearance: Normal appearance. He is not ill-appearing.   Cardiovascular:      Rate and Rhythm: Normal rate and regular rhythm.      Heart sounds: Normal heart sounds.   Pulmonary:      Effort: Pulmonary effort is normal.      Breath sounds: Normal breath sounds.   Skin:     Findings: Rash (Round, red single puncture in middle, ecchymosis ring in periphery, nontender, no calor, no induration, no drainage) present.   Neurological:      Mental Status: He is alert.     Assessment/Plan   Diagnoses and all orders for this visit:  Localized macular rash  New  Possibly an insect bite.  Monitor for red flag signs: induration, calor, and drainage.  Follow up with PCP as needed.     "

## 2025-06-16 ENCOUNTER — APPOINTMENT (OUTPATIENT)
Dept: NEUROSURGERY | Facility: HOSPITAL | Age: 75
End: 2025-06-16
Payer: COMMERCIAL

## 2025-06-18 DIAGNOSIS — M54.16 LUMBAR RADICULOPATHY: ICD-10-CM

## 2025-06-18 DIAGNOSIS — M79.10 MYALGIA: ICD-10-CM

## 2025-06-18 RX ORDER — CYCLOBENZAPRINE HCL 5 MG
5 TABLET ORAL NIGHTLY PRN
Qty: 20 TABLET | Refills: 0 | Status: SHIPPED | OUTPATIENT
Start: 2025-06-20 | End: 2025-07-10

## 2025-06-18 NOTE — TELEPHONE ENCOUNTER
Justin, called requesting a refill of cyclobenzapine  5 mgm.  Justin is scheduled for an LESI on 6/25/25. He also has a follow up appointment with Jeanine on 7/9/25.

## 2025-06-19 ENCOUNTER — APPOINTMENT (OUTPATIENT)
Dept: PRIMARY CARE | Facility: CLINIC | Age: 75
End: 2025-06-19
Payer: COMMERCIAL

## 2025-06-25 ENCOUNTER — ANCILLARY PROCEDURE (OUTPATIENT)
Dept: RADIOLOGY | Facility: EXTERNAL LOCATION | Age: 75
End: 2025-06-25
Payer: COMMERCIAL

## 2025-06-25 DIAGNOSIS — M54.16 RADICULOPATHY, LUMBAR REGION: ICD-10-CM

## 2025-06-25 PROCEDURE — 62323 NJX INTERLAMINAR LMBR/SAC: CPT | Performed by: ANESTHESIOLOGY

## 2025-06-25 NOTE — PROGRESS NOTES
Preprocedure diagnosis: Lumbar radiculopathy  Postprocedure diagnosis lumbar radiculopathy    Procedure performed: L2-3 interlaminar epidural steroid injection    Physician: Donte Moreno MD    Anesthesia: The patient received light sedation/anxiolysis for the procedure today.  The patient had a normal response to verbal stimulation throughout the entire procedure.  Airway, ventilation, and cardiovascular systems were unaffected.        Complications: none    Blood loss:  none    Clinical note: This is a very pleasant 74-year-old male who suffers with low back and leg pain here meeting all medical criteria for above-mentioned procedure.    Procedure:      The patient was identified in the preoperative area.  The procedure was discussed in detail including its risks, benefits and alternatives.  Signed consent was obtained and the patient agreed to proceed.    The patient was brought to the [facility] procedure room and was positioned in the prone position onto the procedure room table.  A pillow was placed below the patient's abdomen.  A safety strap was placed across the legs.  The lumbar region was then exposed, prepped and draped in the usual sterile fashion using 2% Chloroprep scrub.  After that, under fluoroscopic guidance in the AP view the L2-3 intralaminar space was identified.  The intended entry point was marked onto the skin and then 10 ml of 2% preservative-free lidocaine was injected using a 25 gauge needle to anesthetize the skin and subcutaneous tissues along the intended needle trajectory.  Next, an 18 Tuohy needle was advanced under intermittent fluoroscopic guidance until bony contact was made with the lamina of L3.  The Tuohy needle was then walked off the lamina in a cephalad manner into the L2-3 intralaminar space.  Using a loss of resistance technique, the epidural space was entered with ease.  Images were taken in the AP and contralateral oblique views.  The stylette  was removed from the needle and the needle was aspirated, which was negative for heme and CSF.  After that, 1 ml of Omnipaque contrast dye was injected into the needle under live fluoroscopy which showed appropriate epidural spread without intravascular or intrathecal uptake.  Then after another negative aspirate, 80 mg of triamcinolone mixed with 4 mL of preservative-free 0.5% lidocaine was injected via the Tuohy needle.  The needle was then removed and a bandage was applied.  The patient tolerated the procedure well and was transferred back to the discharge area.  The patient was monitored for 15 minutes and vital signs were stable.  The patient was discharged home in stable condition with a .

## 2025-07-09 ENCOUNTER — OFFICE VISIT (OUTPATIENT)
Facility: CLINIC | Age: 75
End: 2025-07-09
Payer: COMMERCIAL

## 2025-07-09 VITALS
BODY MASS INDEX: 29.03 KG/M2 | DIASTOLIC BLOOD PRESSURE: 70 MMHG | RESPIRATION RATE: 16 BRPM | SYSTOLIC BLOOD PRESSURE: 122 MMHG | HEIGHT: 69 IN | WEIGHT: 196 LBS

## 2025-07-09 DIAGNOSIS — M54.16 LUMBAR RADICULITIS: ICD-10-CM

## 2025-07-09 DIAGNOSIS — M54.42 CHRONIC BILATERAL LOW BACK PAIN WITH BILATERAL SCIATICA: Primary | ICD-10-CM

## 2025-07-09 DIAGNOSIS — M54.41 CHRONIC BILATERAL LOW BACK PAIN WITH BILATERAL SCIATICA: Primary | ICD-10-CM

## 2025-07-09 DIAGNOSIS — G89.29 CHRONIC BILATERAL LOW BACK PAIN WITH BILATERAL SCIATICA: Primary | ICD-10-CM

## 2025-07-09 DIAGNOSIS — M96.1 POSTLAMINECTOMY SYNDROME OF LUMBAR REGION: ICD-10-CM

## 2025-07-09 PROCEDURE — 1036F TOBACCO NON-USER: CPT | Performed by: NURSE PRACTITIONER

## 2025-07-09 PROCEDURE — 3074F SYST BP LT 130 MM HG: CPT | Performed by: NURSE PRACTITIONER

## 2025-07-09 PROCEDURE — 3078F DIAST BP <80 MM HG: CPT | Performed by: NURSE PRACTITIONER

## 2025-07-09 PROCEDURE — 99213 OFFICE O/P EST LOW 20 MIN: CPT | Performed by: NURSE PRACTITIONER

## 2025-07-09 PROCEDURE — 1125F AMNT PAIN NOTED PAIN PRSNT: CPT | Performed by: NURSE PRACTITIONER

## 2025-07-09 PROCEDURE — 1159F MED LIST DOCD IN RCRD: CPT | Performed by: NURSE PRACTITIONER

## 2025-07-09 ASSESSMENT — ENCOUNTER SYMPTOMS
CONFUSION: 0
DIFFICULTY URINATING: 0
ABDOMINAL PAIN: 0
ABDOMINAL DISTENTION: 0
BACK PAIN: 1
CONSTITUTIONAL NEGATIVE: 1
HEMATOLOGIC/LYMPHATIC NEGATIVE: 1
AGITATION: 0
SHORTNESS OF BREATH: 0
PALPITATIONS: 0
DIARRHEA: 0
SLEEP DISTURBANCE: 0
COUGH: 0
DYSURIA: 0
NAUSEA: 0
NEUROLOGICAL NEGATIVE: 1
VOMITING: 0

## 2025-07-09 ASSESSMENT — PAIN SCALES - GENERAL
PAINLEVEL_OUTOF10: 3
PAINLEVEL_OUTOF10: 3

## 2025-07-09 ASSESSMENT — PAIN - FUNCTIONAL ASSESSMENT: PAIN_FUNCTIONAL_ASSESSMENT: 0-10

## 2025-07-09 ASSESSMENT — PAIN DESCRIPTION - DESCRIPTORS: DESCRIPTORS: ACHING

## 2025-07-09 NOTE — PROGRESS NOTES
Subjective   Patient ID: Justin Brandt is a 75 y.o. male who presents for Back Pain.  Back Pain  Pertinent negatives include no abdominal pain, chest pain or dysuria.     He is here for a follow up from a L2/3 CAREN. He states overall he had about 60% improvement. He states he notice much improvement into both legs. He did see Dr. Figueroa however he is not interested in any surgery at this time. He is interested in SCS and would like to know more about it.   Review of Systems   Constitutional: Negative.    HENT: Negative.     Respiratory:  Negative for cough and shortness of breath.    Cardiovascular:  Negative for chest pain, palpitations and leg swelling.   Gastrointestinal:  Negative for abdominal distention, abdominal pain, diarrhea, nausea and vomiting.   Endocrine: Negative for cold intolerance and heat intolerance.   Genitourinary:  Negative for difficulty urinating, dysuria and urgency.   Musculoskeletal:  Positive for back pain.   Skin: Negative.    Neurological: Negative.    Hematological: Negative.    Psychiatric/Behavioral:  Negative for agitation, confusion, sleep disturbance and suicidal ideas.        Objective   Physical Exam  Constitutional:       Appearance: Normal appearance.   Musculoskeletal:      Lumbar back: Decreased range of motion.   Neurological:      Mental Status: He is alert and oriented to person, place, and time.      Sensory: Sensory deficit present.      Motor: Weakness present.      Gait: Gait abnormal.   Psychiatric:         Mood and Affect: Mood normal.         Behavior: Behavior normal.         Assessment/Plan   Problem List Items Addressed This Visit    None  Visit Diagnoses         Codes      Chronic bilateral low back pain with bilateral sciatica    -  Primary M54.42, M54.41, G89.29      Postlaminectomy syndrome of lumbar region     M96.1      Lumbar radiculitis     M54.16             I nice discussion with the patient today our plan will be as follows.    Radiology: no new imaging  needed at at this time.     Physically:  continues with a daily home exercise plan.     Psychologically:  no concern.     Medication: none.     Duration:  chonic on going.     Intervention:  doing well since epidural. Was given handout of SCS. Will discuss further with Dr. Moreno next OV.       Jeanine Singletary, ERMELINDA-CNP 07/09/25 8:52 AM

## 2025-07-18 DIAGNOSIS — G89.29 CHRONIC MIDLINE LOW BACK PAIN WITHOUT SCIATICA: Primary | ICD-10-CM

## 2025-07-18 DIAGNOSIS — M54.50 CHRONIC MIDLINE LOW BACK PAIN WITHOUT SCIATICA: Primary | ICD-10-CM

## 2025-07-18 RX ORDER — CYCLOBENZAPRINE HCL 5 MG
5 TABLET ORAL 2 TIMES DAILY PRN
Qty: 60 TABLET | Refills: 2 | Status: SHIPPED | OUTPATIENT
Start: 2025-07-18 | End: 2025-07-28

## 2025-09-01 DIAGNOSIS — K21.9 GASTROESOPHAGEAL REFLUX DISEASE, UNSPECIFIED WHETHER ESOPHAGITIS PRESENT: ICD-10-CM

## 2025-09-02 RX ORDER — OMEPRAZOLE 20 MG/1
20 CAPSULE, DELAYED RELEASE ORAL
Qty: 90 CAPSULE | Refills: 0 | Status: SHIPPED | OUTPATIENT
Start: 2025-09-02

## 2025-09-06 ENCOUNTER — APPOINTMENT (OUTPATIENT)
Dept: CARDIOLOGY | Facility: HOSPITAL | Age: 75
End: 2025-09-06
Payer: COMMERCIAL

## 2025-09-06 ENCOUNTER — HOSPITAL ENCOUNTER (EMERGENCY)
Facility: HOSPITAL | Age: 75
Discharge: OTHER INSTITUTION | End: 2025-09-06
Attending: STUDENT IN AN ORGANIZED HEALTH CARE EDUCATION/TRAINING PROGRAM
Payer: COMMERCIAL

## 2025-09-06 ENCOUNTER — APPOINTMENT (OUTPATIENT)
Dept: RADIOLOGY | Facility: HOSPITAL | Age: 75
End: 2025-09-06
Payer: COMMERCIAL

## 2025-09-06 VITALS
HEIGHT: 69 IN | RESPIRATION RATE: 19 BRPM | WEIGHT: 190 LBS | BODY MASS INDEX: 28.14 KG/M2 | SYSTOLIC BLOOD PRESSURE: 124 MMHG | TEMPERATURE: 98.6 F | OXYGEN SATURATION: 97 % | HEART RATE: 68 BPM | DIASTOLIC BLOOD PRESSURE: 95 MMHG

## 2025-09-06 DIAGNOSIS — Z86.2 HISTORY OF ANTIPHOSPHOLIPID ANTIBODY SYNDROME: ICD-10-CM

## 2025-09-06 DIAGNOSIS — I21.4 NSTEMI (NON-ST ELEVATED MYOCARDIAL INFARCTION) (MULTI): ICD-10-CM

## 2025-09-06 DIAGNOSIS — R07.9 CHEST PAIN, UNSPECIFIED TYPE: Primary | ICD-10-CM

## 2025-09-06 LAB
ANION GAP SERPL CALCULATED.3IONS-SCNC: 12 MMOL/L (ref 10–20)
APTT PPP: 95.1 SECONDS (ref 22–32.5)
APTT PPP: >139 SECONDS (ref 22–32.5)
BASOPHILS # BLD AUTO: 0.04 X10*3/UL (ref 0–0.1)
BASOPHILS NFR BLD AUTO: 0.8 %
BNP SERPL-MCNC: 67 PG/ML (ref 0–99)
BUN SERPL-MCNC: 17 MG/DL (ref 6–23)
CALCIUM SERPL-MCNC: 10 MG/DL (ref 8.6–10.3)
CARDIAC TROPONIN I PNL SERPL HS: 124 NG/L (ref 0–20)
CARDIAC TROPONIN I PNL SERPL HS: 126 NG/L (ref 0–20)
CARDIAC TROPONIN I PNL SERPL HS: 89 NG/L (ref 0–20)
CHLORIDE SERPL-SCNC: 105 MMOL/L (ref 98–107)
CO2 SERPL-SCNC: 24 MMOL/L (ref 21–32)
CREAT SERPL-MCNC: 1.09 MG/DL (ref 0.5–1.3)
EGFRCR SERPLBLD CKD-EPI 2021: 70 ML/MIN/1.73M*2
EOSINOPHIL # BLD AUTO: 0.11 X10*3/UL (ref 0–0.4)
EOSINOPHIL NFR BLD AUTO: 2.3 %
ERYTHROCYTE [DISTWIDTH] IN BLOOD BY AUTOMATED COUNT: 13.7 % (ref 11.5–14.5)
ERYTHROCYTE [DISTWIDTH] IN BLOOD BY AUTOMATED COUNT: 13.9 % (ref 11.5–14.5)
GLUCOSE SERPL-MCNC: 175 MG/DL (ref 74–99)
HCT VFR BLD AUTO: 36.3 % (ref 41–52)
HCT VFR BLD AUTO: 38.9 % (ref 41–52)
HGB BLD-MCNC: 11.9 G/DL (ref 13.5–17.5)
HGB BLD-MCNC: 12.9 G/DL (ref 13.5–17.5)
IMM GRANULOCYTES # BLD AUTO: 0.01 X10*3/UL (ref 0–0.5)
IMM GRANULOCYTES NFR BLD AUTO: 0.2 % (ref 0–0.9)
LYMPHOCYTES # BLD AUTO: 1.39 X10*3/UL (ref 0.8–3)
LYMPHOCYTES NFR BLD AUTO: 28.6 %
MAGNESIUM SERPL-MCNC: 1.88 MG/DL (ref 1.6–2.4)
MCH RBC QN AUTO: 27.5 PG (ref 26–34)
MCH RBC QN AUTO: 27.7 PG (ref 26–34)
MCHC RBC AUTO-ENTMCNC: 32.8 G/DL (ref 32–36)
MCHC RBC AUTO-ENTMCNC: 33.2 G/DL (ref 32–36)
MCV RBC AUTO: 84 FL (ref 80–100)
MCV RBC AUTO: 84 FL (ref 80–100)
MONOCYTES # BLD AUTO: 0.47 X10*3/UL (ref 0.05–0.8)
MONOCYTES NFR BLD AUTO: 9.7 %
NEUTROPHILS # BLD AUTO: 2.84 X10*3/UL (ref 1.6–5.5)
NEUTROPHILS NFR BLD AUTO: 58.4 %
NRBC BLD-RTO: 0 /100 WBCS (ref 0–0)
NRBC BLD-RTO: 0 /100 WBCS (ref 0–0)
PLATELET # BLD AUTO: 160 X10*3/UL (ref 150–450)
PLATELET # BLD AUTO: 168 X10*3/UL (ref 150–450)
POTASSIUM SERPL-SCNC: 4 MMOL/L (ref 3.5–5.3)
RBC # BLD AUTO: 4.33 X10*6/UL (ref 4.5–5.9)
RBC # BLD AUTO: 4.65 X10*6/UL (ref 4.5–5.9)
SODIUM SERPL-SCNC: 137 MMOL/L (ref 136–145)
UFH PPP CHRO-ACNC: 1.3 IU/ML (ref ?–1.1)
WBC # BLD AUTO: 4.9 X10*3/UL (ref 4.4–11.3)
WBC # BLD AUTO: 5.8 X10*3/UL (ref 4.4–11.3)

## 2025-09-06 PROCEDURE — 84484 ASSAY OF TROPONIN QUANT: CPT

## 2025-09-06 PROCEDURE — 2500000002 HC RX 250 W HCPCS SELF ADMINISTERED DRUGS (ALT 637 FOR MEDICARE OP, ALT 636 FOR OP/ED)

## 2025-09-06 PROCEDURE — 71275 CT ANGIOGRAPHY CHEST: CPT

## 2025-09-06 PROCEDURE — 85730 THROMBOPLASTIN TIME PARTIAL: CPT

## 2025-09-06 PROCEDURE — 2500000005 HC RX 250 GENERAL PHARMACY W/O HCPCS: Performed by: STUDENT IN AN ORGANIZED HEALTH CARE EDUCATION/TRAINING PROGRAM

## 2025-09-06 PROCEDURE — 93005 ELECTROCARDIOGRAM TRACING: CPT

## 2025-09-06 PROCEDURE — 85027 COMPLETE CBC AUTOMATED: CPT

## 2025-09-06 PROCEDURE — 36415 COLL VENOUS BLD VENIPUNCTURE: CPT

## 2025-09-06 PROCEDURE — 84484 ASSAY OF TROPONIN QUANT: CPT | Performed by: EMERGENCY MEDICINE

## 2025-09-06 PROCEDURE — 85025 COMPLETE CBC W/AUTO DIFF WBC: CPT

## 2025-09-06 PROCEDURE — 99285 EMERGENCY DEPT VISIT HI MDM: CPT | Mod: 25 | Performed by: STUDENT IN AN ORGANIZED HEALTH CARE EDUCATION/TRAINING PROGRAM

## 2025-09-06 PROCEDURE — 2500000004 HC RX 250 GENERAL PHARMACY W/ HCPCS (ALT 636 FOR OP/ED): Mod: JZ | Performed by: STUDENT IN AN ORGANIZED HEALTH CARE EDUCATION/TRAINING PROGRAM

## 2025-09-06 PROCEDURE — 99291 CRITICAL CARE FIRST HOUR: CPT | Performed by: STUDENT IN AN ORGANIZED HEALTH CARE EDUCATION/TRAINING PROGRAM

## 2025-09-06 PROCEDURE — 2500000004 HC RX 250 GENERAL PHARMACY W/ HCPCS (ALT 636 FOR OP/ED)

## 2025-09-06 PROCEDURE — 83735 ASSAY OF MAGNESIUM: CPT

## 2025-09-06 PROCEDURE — 83880 ASSAY OF NATRIURETIC PEPTIDE: CPT

## 2025-09-06 PROCEDURE — 2550000001 HC RX 255 CONTRASTS: Performed by: STUDENT IN AN ORGANIZED HEALTH CARE EDUCATION/TRAINING PROGRAM

## 2025-09-06 PROCEDURE — 80048 BASIC METABOLIC PNL TOTAL CA: CPT

## 2025-09-06 PROCEDURE — 85520 HEPARIN ASSAY: CPT | Mod: WESLAB

## 2025-09-06 PROCEDURE — 2500000001 HC RX 250 WO HCPCS SELF ADMINISTERED DRUGS (ALT 637 FOR MEDICARE OP)

## 2025-09-06 PROCEDURE — 85520 HEPARIN ASSAY: CPT

## 2025-09-06 RX ORDER — AMOXICILLIN AND CLAVULANATE POTASSIUM 875; 125 MG/1; MG/1
1 TABLET, FILM COATED ORAL EVERY 12 HOURS
Qty: 14 TABLET | Refills: 0 | Status: SHIPPED | OUTPATIENT
Start: 2025-09-06 | End: 2025-09-06 | Stop reason: ENTERED-IN-ERROR

## 2025-09-06 RX ORDER — AMOXICILLIN AND CLAVULANATE POTASSIUM 875; 125 MG/1; MG/1
1 TABLET, FILM COATED ORAL ONCE
Status: DISCONTINUED | OUTPATIENT
Start: 2025-09-06 | End: 2025-09-06

## 2025-09-06 RX ORDER — HYDROMORPHONE HYDROCHLORIDE 1 MG/ML
1 INJECTION, SOLUTION INTRAMUSCULAR; INTRAVENOUS; SUBCUTANEOUS ONCE
Status: COMPLETED | OUTPATIENT
Start: 2025-09-06 | End: 2025-09-06

## 2025-09-06 RX ORDER — ONDANSETRON HYDROCHLORIDE 2 MG/ML
4 INJECTION, SOLUTION INTRAVENOUS ONCE
Status: COMPLETED | OUTPATIENT
Start: 2025-09-06 | End: 2025-09-06

## 2025-09-06 RX ORDER — ACETAMINOPHEN 500 MG
1000 TABLET ORAL ONCE
Status: COMPLETED | OUTPATIENT
Start: 2025-09-06 | End: 2025-09-06

## 2025-09-06 RX ORDER — HEPARIN SODIUM 5000 [USP'U]/ML
2000-4000 INJECTION, SOLUTION INTRAVENOUS; SUBCUTANEOUS AS NEEDED
Status: DISCONTINUED | OUTPATIENT
Start: 2025-09-06 | End: 2025-09-06 | Stop reason: HOSPADM

## 2025-09-06 RX ORDER — NITROGLYCERIN 20 MG/G
0.5 OINTMENT TOPICAL ONCE
Status: COMPLETED | OUTPATIENT
Start: 2025-09-06 | End: 2025-09-06

## 2025-09-06 RX ORDER — DIPHENHYDRAMINE HYDROCHLORIDE 50 MG/ML
50 INJECTION, SOLUTION INTRAMUSCULAR; INTRAVENOUS ONCE
Status: COMPLETED | OUTPATIENT
Start: 2025-09-06 | End: 2025-09-06

## 2025-09-06 RX ORDER — HEPARIN SODIUM 5000 [USP'U]/ML
3000-6000 INJECTION, SOLUTION INTRAVENOUS; SUBCUTANEOUS AS NEEDED
Status: DISCONTINUED | OUTPATIENT
Start: 2025-09-06 | End: 2025-09-06

## 2025-09-06 RX ORDER — IPRATROPIUM BROMIDE AND ALBUTEROL SULFATE 2.5; .5 MG/3ML; MG/3ML
3 SOLUTION RESPIRATORY (INHALATION) ONCE
Status: COMPLETED | OUTPATIENT
Start: 2025-09-06 | End: 2025-09-06

## 2025-09-06 RX ORDER — HEPARIN SODIUM 10000 [USP'U]/100ML
0-4000 INJECTION, SOLUTION INTRAVENOUS CONTINUOUS
Status: DISCONTINUED | OUTPATIENT
Start: 2025-09-06 | End: 2025-09-06 | Stop reason: HOSPADM

## 2025-09-06 RX ORDER — FENTANYL CITRATE 50 UG/ML
50 INJECTION, SOLUTION INTRAMUSCULAR; INTRAVENOUS ONCE
Status: COMPLETED | OUTPATIENT
Start: 2025-09-06 | End: 2025-09-06

## 2025-09-06 RX ORDER — HEPARIN SODIUM 10000 [USP'U]/100ML
0-4500 INJECTION, SOLUTION INTRAVENOUS CONTINUOUS
Status: DISCONTINUED | OUTPATIENT
Start: 2025-09-06 | End: 2025-09-06

## 2025-09-06 RX ORDER — CEFTRIAXONE 2 G/50ML
2 INJECTION, SOLUTION INTRAVENOUS ONCE
Status: COMPLETED | OUTPATIENT
Start: 2025-09-06 | End: 2025-09-06

## 2025-09-06 RX ORDER — MORPHINE SULFATE 4 MG/ML
4 INJECTION, SOLUTION INTRAMUSCULAR; INTRAVENOUS ONCE
Status: COMPLETED | OUTPATIENT
Start: 2025-09-06 | End: 2025-09-06

## 2025-09-06 RX ORDER — HEPARIN SODIUM 5000 [USP'U]/ML
80 INJECTION, SOLUTION INTRAVENOUS; SUBCUTANEOUS ONCE
Status: COMPLETED | OUTPATIENT
Start: 2025-09-06 | End: 2025-09-06

## 2025-09-06 RX ADMIN — DOXYCYCLINE HYCLATE 100 MG: 100 POWDER, FOR SOLUTION INTRAVENOUS at 19:15

## 2025-09-06 RX ADMIN — CEFTRIAXONE 2 G: 2 INJECTION, SOLUTION INTRAVENOUS at 19:15

## 2025-09-06 RX ADMIN — FENTANYL CITRATE 50 MCG: 50 INJECTION INTRAMUSCULAR; INTRAVENOUS at 12:32

## 2025-09-06 RX ADMIN — ACETAMINOPHEN 1000 MG: 500 TABLET ORAL at 12:32

## 2025-09-06 RX ADMIN — HYDROMORPHONE HYDROCHLORIDE 1 MG: 1 INJECTION, SOLUTION INTRAMUSCULAR; INTRAVENOUS; SUBCUTANEOUS at 13:58

## 2025-09-06 RX ADMIN — METHYLPREDNISOLONE SODIUM SUCCINATE 40 MG: 40 INJECTION, POWDER, FOR SOLUTION INTRAMUSCULAR; INTRAVENOUS at 13:14

## 2025-09-06 RX ADMIN — ONDANSETRON 4 MG: 2 INJECTION, SOLUTION INTRAMUSCULAR; INTRAVENOUS at 12:02

## 2025-09-06 RX ADMIN — METHYLPREDNISOLONE SODIUM SUCCINATE 40 MG: 40 INJECTION, POWDER, FOR SOLUTION INTRAMUSCULAR; INTRAVENOUS at 16:42

## 2025-09-06 RX ADMIN — HEPARIN SODIUM 7000 UNITS: 5000 INJECTION, SOLUTION INTRAVENOUS; SUBCUTANEOUS at 13:15

## 2025-09-06 RX ADMIN — MORPHINE SULFATE 4 MG: 4 INJECTION, SOLUTION INTRAMUSCULAR; INTRAVENOUS at 12:03

## 2025-09-06 RX ADMIN — IOHEXOL 75 ML: 350 INJECTION, SOLUTION INTRAVENOUS at 16:49

## 2025-09-06 RX ADMIN — DIPHENHYDRAMINE HYDROCHLORIDE 50 MG: 50 INJECTION, SOLUTION INTRAMUSCULAR; INTRAVENOUS at 15:49

## 2025-09-06 RX ADMIN — HEPARIN SODIUM 1600 UNITS/HR: 10000 INJECTION, SOLUTION INTRAVENOUS at 13:14

## 2025-09-06 RX ADMIN — NITROGLYCERIN 0.5 INCH: 20 OINTMENT TOPICAL at 12:14

## 2025-09-06 RX ADMIN — IPRATROPIUM BROMIDE AND ALBUTEROL SULFATE 3 ML: 2.5; .5 SOLUTION RESPIRATORY (INHALATION) at 13:14

## 2025-09-06 ASSESSMENT — HEART SCORE
TROPONIN: GREATER THAN OR EQUAL TO 3 TIMES NORMAL LIMIT
ECG: NORMAL
HEART SCORE: 8
HISTORY: HIGHLY SUSPICIOUS
AGE: 65+
RISK FACTORS: >2 RISK FACTORS OR HX OF ATHEROSCLEROTIC DISEASE

## 2025-09-06 ASSESSMENT — PAIN - FUNCTIONAL ASSESSMENT: PAIN_FUNCTIONAL_ASSESSMENT: 0-10

## 2025-09-06 ASSESSMENT — PAIN DESCRIPTION - LOCATION: LOCATION: CHEST

## 2025-09-06 ASSESSMENT — PAIN SCALES - GENERAL: PAINLEVEL_OUTOF10: 10 - WORST POSSIBLE PAIN

## 2025-12-10 ENCOUNTER — APPOINTMENT (OUTPATIENT)
Dept: PRIMARY CARE | Facility: CLINIC | Age: 75
End: 2025-12-10
Payer: COMMERCIAL